# Patient Record
Sex: FEMALE | Race: WHITE | NOT HISPANIC OR LATINO | Employment: STUDENT | URBAN - METROPOLITAN AREA
[De-identification: names, ages, dates, MRNs, and addresses within clinical notes are randomized per-mention and may not be internally consistent; named-entity substitution may affect disease eponyms.]

---
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
## 2023-02-09 ENCOUNTER — E-VISIT (OUTPATIENT)
Dept: FAMILY MEDICINE | Facility: CLINIC | Age: 18
End: 2023-02-09
Payer: COMMERCIAL

## 2023-02-09 DIAGNOSIS — Z30.9 ENCOUNTER FOR CONTRACEPTIVE MANAGEMENT, UNSPECIFIED TYPE: Primary | ICD-10-CM

## 2023-02-09 PROCEDURE — 99207 PR NON-BILLABLE SERV PER CHARTING: CPT | Performed by: FAMILY MEDICINE

## 2023-02-13 ENCOUNTER — E-VISIT (OUTPATIENT)
Dept: URGENT CARE | Facility: URGENT CARE | Age: 18
End: 2023-02-13
Payer: COMMERCIAL

## 2023-02-13 DIAGNOSIS — B37.31 CANDIDAL VULVOVAGINITIS: Primary | ICD-10-CM

## 2023-02-13 PROCEDURE — 99421 OL DIG E/M SVC 5-10 MIN: CPT | Performed by: PHYSICIAN ASSISTANT

## 2023-02-13 RX ORDER — FLUCONAZOLE 150 MG/1
150 TABLET ORAL ONCE
Qty: 1 TABLET | Refills: 0 | Status: SHIPPED | OUTPATIENT
Start: 2023-02-13 | End: 2023-02-13

## 2023-02-14 NOTE — PATIENT INSTRUCTIONS
Thank you for choosing us for your care. I have placed an order for a prescription so that you can start treatment. View your full visit summary for details by clicking on the link below. Your pharmacist will able to address any questions you may have about the medication.     If you re not feeling better within 2-3 days, please schedule an appointment.  You can schedule an appointment right here in [facility], or call [phone]  If the visit is for the same symptoms as your eVisit, we ll refund the cost of your eVisit if seen within seven days.      Yeast Infection (Candida Vaginal Infection)    You have a Candida vaginal infection. This is also known as a yeast infection. It's most often caused by a type of yeast (fungus) called Candida. Candida are normally found in the vagina. But if they increase in number, this can lead to infection and cause symptoms.   Symptoms of a yeast infection can include:     Clumpy or thin, white discharge, which may look like cottage cheese    Itching or burning    Burning with urination  Certain factors can make a yeast infection more likely. These can include:     Taking certain medicines, such as antibiotics or birth control pills    Pregnancy    Diabetes    Weak immune system  A yeast infection is most often treated with antifungal medicine. This may be given as a vaginal cream or pills you take by mouth. Treatment may last for about 1 to 7 days. Women with severe or recurrent infections may need longer courses of treatment.   Home care    If you re prescribed medicine, be sure to use it as directed. Finish all of the medicine, even if your symptoms go away. Don t try to treat yourself using over-the-counter products without talking with your provider first. They will let you know if this is a good option for you.    Ask your provider what steps you can take to help reduce your risk of having a yeast infection in the future.    Follow-up care  Follow up with your healthcare  provider, or as directed.   When to seek medical advice  Call your healthcare provider right away if:     You have a fever of 100.4 F (38 C) or higher, or as directed by your provider.    Your symptoms worsen, or they don t go away within a few days of starting treatment.    You have new pain in the lower belly or pelvic region.    You have side effects that bother you or a reaction to the cream or pills you re prescribed.    You or any partners you have sex with have new symptoms, such as a rash, joint pain, or sores.  [employer] last reviewed this educational content on 7/1/2020 2000-2021 [employer]. All rights reserved. This information is not intended as a substitute for professional medical care. Always follow your healthcare professional's instructions.

## 2023-03-05 ENCOUNTER — E-VISIT (OUTPATIENT)
Dept: URGENT CARE | Facility: CLINIC | Age: 18
End: 2023-03-05
Payer: COMMERCIAL

## 2023-03-05 DIAGNOSIS — N89.8 VAGINAL DISCHARGE: Primary | ICD-10-CM

## 2023-03-05 PROCEDURE — 99421 OL DIG E/M SVC 5-10 MIN: CPT | Performed by: NURSE PRACTITIONER

## 2023-03-05 NOTE — PATIENT INSTRUCTIONS
Thank you for choosing us for your care. Given your symptoms, I would like you to do a lab-only visit to determine what is causing them.  I have placed the orders.  Please schedule an appointment with the lab right here in [id number], or call [phone].  I will let you know when the results are back and next steps to take.

## 2023-03-11 ENCOUNTER — E-VISIT (OUTPATIENT)
Dept: URGENT CARE | Facility: CLINIC | Age: 18
End: 2023-03-11
Payer: COMMERCIAL

## 2023-03-11 DIAGNOSIS — B37.31 CANDIDAL VULVOVAGINITIS: Primary | ICD-10-CM

## 2023-03-11 PROCEDURE — 99421 OL DIG E/M SVC 5-10 MIN: CPT | Performed by: NURSE PRACTITIONER

## 2023-03-11 RX ORDER — FLUCONAZOLE 150 MG/1
150 TABLET ORAL ONCE
Qty: 1 TABLET | Refills: 0 | Status: SHIPPED | OUTPATIENT
Start: 2023-03-11 | End: 2023-03-11

## 2023-03-11 NOTE — PATIENT INSTRUCTIONS
Yeast Infection (Candida Vaginal Infection)    You have a Candida vaginal infection. This is also known as a yeast infection. It's most often caused by a type of yeast (fungus) called Candida. Candida are normally found in the vagina. But if they increase in number, this can lead to infection and cause symptoms.   Symptoms of a yeast infection can include:     Clumpy or thin, white discharge, which may look like cottage cheese    Itching or burning    Burning with urination  Certain factors can make a yeast infection more likely. These can include:     Taking certain medicines, such as antibiotics or birth control pills    Pregnancy    Diabetes    Weak immune system  A yeast infection is most often treated with antifungal medicine. This may be given as a vaginal cream or pills you take by mouth. Treatment may last for about 1 to 7 days. Women with severe or recurrent infections may need longer courses of treatment.   Home care    If you re prescribed medicine, be sure to use it as directed. Finish all of the medicine, even if your symptoms go away. Don t try to treat yourself using over-the-counter products without talking with your provider first. They will let you know if this is a good option for you.    Ask your provider what steps you can take to help reduce your risk of having a yeast infection in the future.    Follow-up care  Follow up with your healthcare provider, or as directed.   When to seek medical advice  Call your healthcare provider right away if:     You have a fever of 100.4 F (38 C) or higher, or as directed by your provider.    Your symptoms worsen, or they don t go away within a few days of starting treatment.    You have new pain in the lower belly or pelvic region.    You have side effects that bother you or a reaction to the cream or pills you re prescribed.    You or any partners you have sex with have new symptoms, such as a rash, joint pain, or sores.  Jocelyn last reviewed this  educational content on 7/1/2020 2000-2021 [employer]. All rights reserved. This information is not intended as a substitute for professional medical care. Always follow your healthcare professional's instructions.

## 2023-04-29 ENCOUNTER — HEALTH MAINTENANCE LETTER (OUTPATIENT)
Age: 18
End: 2023-04-29

## 2023-07-13 ENCOUNTER — OFFICE VISIT (OUTPATIENT)
Dept: OBGYN | Facility: CLINIC | Age: 18
End: 2023-07-13
Payer: COMMERCIAL

## 2023-07-13 VITALS
DIASTOLIC BLOOD PRESSURE: 71 MMHG | BODY MASS INDEX: 21.82 KG/M2 | HEART RATE: 70 BPM | WEIGHT: 139 LBS | TEMPERATURE: 98.2 F | HEIGHT: 67 IN | SYSTOLIC BLOOD PRESSURE: 123 MMHG

## 2023-07-13 DIAGNOSIS — Z01.419 ENCOUNTER FOR ANNUAL ROUTINE GYNECOLOGICAL EXAMINATION: Primary | ICD-10-CM

## 2023-07-13 DIAGNOSIS — N94.10 DYSPAREUNIA, FEMALE: ICD-10-CM

## 2023-07-13 LAB
C TRACH DNA SPEC QL NAA+PROBE: NEGATIVE
N GONORRHOEA DNA SPEC QL NAA+PROBE: NEGATIVE

## 2023-07-13 PROCEDURE — 99385 PREV VISIT NEW AGE 18-39: CPT | Performed by: OBSTETRICS & GYNECOLOGY

## 2023-07-13 PROCEDURE — 87591 N.GONORRHOEAE DNA AMP PROB: CPT | Performed by: OBSTETRICS & GYNECOLOGY

## 2023-07-13 PROCEDURE — 87491 CHLMYD TRACH DNA AMP PROBE: CPT | Performed by: OBSTETRICS & GYNECOLOGY

## 2023-07-13 ASSESSMENT — PATIENT HEALTH QUESTIONNAIRE - PHQ9: SUM OF ALL RESPONSES TO PHQ QUESTIONS 1-9: 0

## 2023-07-13 NOTE — PROGRESS NOTES
Sandra is a 18 year old No obstetric history on file. female who presents for annual exam.     Menses are regular q 28-30 days and normal lasting 5 days.  Menses flow: normal.  Patient's last menstrual period was 06/29/2023 (exact date).. Using condoms for contraception.  She is not currently considering pregnancy.  Besides routine health maintenance,  she would like to discuss dyspareunia. She is sexually active with male and female partners and reports discomfort with vaginal penetration right at the vaginal introitus. She also has discomfort sometimes with vaginal penetration with masturbation or placing her menstrual disc. She has had this discomfort since onset of sexual activity and has not improved. She denies pelvic pain or dysmenorrhea.   GYNECOLOGIC HISTORY:  Menarche: 12  Age at first intercourse: 16   Number of lifetime partners: 7  Sandra is not sexually active with Females and males partner(s) and is not currently in monogamous relationship with anyone.    History sexually transmitted infections:No STD history  STI testing offered? Accepts   ZAHIDA exposure: Unknown  History of abnormal Pap smear: NO - under age 21, PAP not appropriate for age  Family history of breast CA: No  Family history of uterine/ovarian CA: No    Family history of colon CA: No    HEALTH MAINTENANCE:  Cholesterol: (No results found for: CHOL History of abnormal lipids: No  Mammo: N/A . History of abnormal Mammo: Not applicable.  Regular Self Breast Exams: No  Calcium/Vitamin D intake: source:  none Adequate? No  TSH: (  TSH   Date Value Ref Range Status   07/12/2022 0.85 0.40 - 4.00 mU/L Final    )  Pap; (No results found for: PAP )    HISTORY:  OB History   No obstetric history on file.     No past medical history on file.  No past surgical history on file.  No family history on file.  Social History     Socioeconomic History     Marital status: Single     Spouse name: None     Number of children: None     Years of education:  "None     Highest education level: None   Tobacco Use     Smoking status: Never     Smokeless tobacco: Never       Current Outpatient Medications:      amphetamine-dextroamphetamine (ADDERALL XR) 5 MG 24 hr capsule, Take 5-10 mg by mouth daily (Patient not taking: Reported on 7/12/2022), Disp: , Rfl:      levalbuterol (XOPENEX HFA) 45 MCG/ACT inhaler, Inhale 2 puffs into the lungs every 4 hours as needed for shortness of breath or wheezing, Disp: 15 g, Rfl: 1     trimethoprim-polymyxin b (POLYTRIM) 70997-8.1 UNIT/ML-% ophthalmic solution, Place 1-2 drops into both eyes every 4 hours, Disp: 10 mL, Rfl: 0   No Known Allergies    Past medical, surgical, social and family history were reviewed and updated in EPIC.    ROS:   C:     NEGATIVE for fever, chills, change in weight  I:       NEGATIVE for worrisome rashes, moles or lesions  E:     NEGATIVE for vision changes or irritation  E/M: NEGATIVE for ear, mouth and throat problems  R:     NEGATIVE for significant cough or SOB  CV:   NEGATIVE for chest pain, palpitations or peripheral edema  GI:     NEGATIVE for nausea, abdominal pain, heartburn, or change in bowel habits  :   NEGATIVE for frequency, dysuria, hematuria, vaginal discharge, or irregular bleeding  M:     NEGATIVE for significant arthralgias or myalgia  N:      NEGATIVE for weakness, dizziness or paresthesias  E:      NEGATIVE for temperature intolerance, skin/hair changes  P:      NEGATIVE for changes in mood or affect.    EXAM:  /71 (BP Location: Left arm, Patient Position: Sitting, Cuff Size: Adult Regular)   Pulse 70   Temp 98.2  F (36.8  C)   Ht 1.69 m (5' 6.54\")   Wt 63 kg (139 lb)   LMP 06/29/2023 (Exact Date)   BMI 22.08 kg/m     BMI: Body mass index is 22.08 kg/m .  Constitutional: healthy, alert and no distress  Head: Normocephalic. No masses, lesions, tenderness or abnormalities  Neck: Neck supple. Trachea midline. No adenopathy. Thyroid symmetric, normal size.   Cardiovascular: RRR. "   Respiratory: Negative.   Breast: Deferred  Gastrointestinal: Abdomen soft, non-tender, non-distended. No masses, organomegaly.  :  Vulva:  No external lesions, normal female hair distribution, no inguinal adenopathy.    Urethra:  Midline, non-tender, well supported, no discharge  Vagina:  Moist, pink, no abnormal discharge, no lesions. Narrow hymenal ring noted but no redundant tissue.   Uterus:  Normal size, contour , non-tender, freely mobile  Ovaries:  No masses appreciated, non-tender, mobile  Musculoskeletal: extremities normal  Skin: no suspicious lesions or rashes  Psychiatric: Affect appropriate, cooperative,mentation appears normal.     COUNSELING:   Reviewed preventive health counseling, as reflected in patient instructions       Contraception       Safe sex practices/STD prevention   reports that she has never smoked. She has never been exposed to tobacco smoke. She has never used smokeless tobacco.    Body mass index is 22.08 kg/m .    FRAX Risk Assessment    PATIENT HEALTH QUESTIONNAIRE-9 (PHQ - 9)    Over the last 2 weeks, how often have you been bothered by any of the following problems?    1. Little interest or pleasure in doing things -  Not at all   2. Feeling down, depressed, or hopeless -  Not at all   3. Trouble falling or staying asleep, or sleeping too much - Not at all   4. Feeling tired or having little energy -  Not at all   5. Poor appetite or overeating -  Not at all   6. Feeling bad about yourself - or that you are a failure or have let yourself or your family down -  Not at all   7. Trouble concentrating on things, such as reading the newspaper or watching television - Not at all   8. Moving or speaking so slowly that other people could have noticed? Or the opposite - being so fidgety or restless that you have been moving around a lot more than usual Not at all   9. Thoughts that you would be better off dead or of hurting  yourself in some way Not at all   Total Score: 0     If you  checked off any problems, how difficult have these problems made it for you to do your work, take care of things at home, or get along with other people? Not difficult at all    Developed by Shantell Hill, Julissa Ellis, Abdulaziz Watson and colleagues, with an educational judi from [employer] No permission required to reproduce, translate, display or distribute. permission required to reproduce, translate, display or distribute.  ASSESSMENT:  18 year old female with satisfactory annual exam  (Z01.419) Encounter for annual routine gynecological examination  (primary encounter diagnosis)  -Earl screening UTD but GC/CT obtained due to new partners since last screening   -Negative PHQ  -HPV vaccine series completed, due for pap in 5/2024   Plan: NEISSERIA GONORRHOEA PCR, CHLAMYDIA TRACHOMATIS        PCR          (N94.10) Dyspareunia, female  -No pelvic floor dysfunction noted on exam and low concern for endometriosis given history. Discomfort likely 2/2 narrow hymenal ring but no signs of imperforate hymen and patient able to tolerate pelvic exam in office.   -Recommend that patient do a trial of vaginal dilators at home and if symptoms not improving, would recommend referral to pelvic floor physical therapy.     Dispo: RTC as needed   Nola Christina MD

## 2023-08-09 ENCOUNTER — LAB (OUTPATIENT)
Dept: LAB | Facility: CLINIC | Age: 18
End: 2023-08-09
Payer: COMMERCIAL

## 2023-08-09 DIAGNOSIS — N94.6 DYSMENORRHEA: ICD-10-CM

## 2023-08-09 DIAGNOSIS — F41.9 ANXIETY HYPERVENTILATION: ICD-10-CM

## 2023-08-09 DIAGNOSIS — R51.9 FACIAL PAIN: ICD-10-CM

## 2023-08-09 DIAGNOSIS — E55.9 VITAMIN D DEFICIENCY DISEASE: ICD-10-CM

## 2023-08-09 DIAGNOSIS — F45.8 ANXIETY HYPERVENTILATION: ICD-10-CM

## 2023-08-09 DIAGNOSIS — R53.83 FATIGUE: Primary | ICD-10-CM

## 2023-08-09 LAB
ALT SERPL W P-5'-P-CCNC: 21 U/L (ref 0–50)
AST SERPL W P-5'-P-CCNC: 28 U/L (ref 0–45)
CORTIS SERPL-MCNC: 7.9 UG/DL
CRP SERPL-MCNC: <3 MG/L
ESTRADIOL SERPL-MCNC: 50 PG/ML
FERRITIN SERPL-MCNC: 62 NG/ML (ref 6–175)
PROGEST SERPL-MCNC: 4.5 NG/ML
T3FREE SERPL-MCNC: 2.9 PG/ML (ref 2–4.4)
T4 FREE SERPL-MCNC: 1.29 NG/DL (ref 0.9–1.7)
TSH SERPL DL<=0.005 MIU/L-ACNC: 0.71 UIU/ML (ref 0.3–4.2)
VIT B12 SERPL-MCNC: 263 PG/ML (ref 232–1245)

## 2023-08-09 PROCEDURE — 84432 ASSAY OF THYROGLOBULIN: CPT | Mod: 59

## 2023-08-09 PROCEDURE — 82627 DEHYDROEPIANDROSTERONE: CPT

## 2023-08-09 PROCEDURE — 84403 ASSAY OF TOTAL TESTOSTERONE: CPT

## 2023-08-09 PROCEDURE — 84432 ASSAY OF THYROGLOBULIN: CPT

## 2023-08-09 PROCEDURE — 86800 THYROGLOBULIN ANTIBODY: CPT

## 2023-08-09 PROCEDURE — 84450 TRANSFERASE (AST) (SGOT): CPT

## 2023-08-09 PROCEDURE — 84443 ASSAY THYROID STIM HORMONE: CPT

## 2023-08-09 PROCEDURE — 86376 MICROSOMAL ANTIBODY EACH: CPT

## 2023-08-09 PROCEDURE — 84144 ASSAY OF PROGESTERONE: CPT

## 2023-08-09 PROCEDURE — 86140 C-REACTIVE PROTEIN: CPT

## 2023-08-09 PROCEDURE — 82728 ASSAY OF FERRITIN: CPT

## 2023-08-09 PROCEDURE — 82607 VITAMIN B-12: CPT

## 2023-08-09 PROCEDURE — 84481 FREE ASSAY (FT-3): CPT

## 2023-08-09 PROCEDURE — 82670 ASSAY OF TOTAL ESTRADIOL: CPT

## 2023-08-09 PROCEDURE — 36415 COLL VENOUS BLD VENIPUNCTURE: CPT

## 2023-08-09 PROCEDURE — 84460 ALANINE AMINO (ALT) (SGPT): CPT

## 2023-08-09 PROCEDURE — 84439 ASSAY OF FREE THYROXINE: CPT

## 2023-08-09 PROCEDURE — 82306 VITAMIN D 25 HYDROXY: CPT

## 2023-08-09 PROCEDURE — 99000 SPECIMEN HANDLING OFFICE-LAB: CPT

## 2023-08-09 PROCEDURE — 82533 TOTAL CORTISOL: CPT

## 2023-08-09 PROCEDURE — 86337 INSULIN ANTIBODIES: CPT | Mod: 90

## 2023-08-10 LAB
DEPRECATED CALCIDIOL+CALCIFEROL SERPL-MC: 34 UG/L (ref 20–75)
DHEA-S SERPL-MCNC: 138 UG/DL (ref 35–430)
THYROGLOB AB SERPL IA-ACNC: <20 IU/ML
THYROGLOB SERPL-MCNC: 17.2 NG/ML
THYROPEROXIDASE AB SERPL-ACNC: <10 IU/ML

## 2023-08-11 LAB — TESTOST SERPL-MCNC: 17 NG/DL (ref 8–60)

## 2023-08-13 LAB — INSULIN AB SER IA-ACNC: <0.4 U/ML

## 2023-09-05 ENCOUNTER — OFFICE VISIT (OUTPATIENT)
Dept: FAMILY MEDICINE | Facility: CLINIC | Age: 18
End: 2023-09-05
Payer: COMMERCIAL

## 2023-09-05 VITALS
DIASTOLIC BLOOD PRESSURE: 62 MMHG | SYSTOLIC BLOOD PRESSURE: 106 MMHG | RESPIRATION RATE: 18 BRPM | OXYGEN SATURATION: 98 % | HEART RATE: 76 BPM | TEMPERATURE: 98.3 F | BODY MASS INDEX: 22.19 KG/M2 | WEIGHT: 139.7 LBS

## 2023-09-05 DIAGNOSIS — S43.431D TEAR OF RIGHT GLENOID LABRUM, SUBSEQUENT ENCOUNTER: ICD-10-CM

## 2023-09-05 DIAGNOSIS — J45.40 MODERATE PERSISTENT ASTHMA WITHOUT COMPLICATION: ICD-10-CM

## 2023-09-05 DIAGNOSIS — Z01.818 PREOP GENERAL PHYSICAL EXAM: Primary | ICD-10-CM

## 2023-09-05 LAB
HCG UR QL: NEGATIVE
HGB BLD-MCNC: 14.4 G/DL (ref 11.7–15.7)

## 2023-09-05 PROCEDURE — 36415 COLL VENOUS BLD VENIPUNCTURE: CPT | Performed by: FAMILY MEDICINE

## 2023-09-05 PROCEDURE — 99214 OFFICE O/P EST MOD 30 MIN: CPT | Performed by: FAMILY MEDICINE

## 2023-09-05 PROCEDURE — 85018 HEMOGLOBIN: CPT | Performed by: FAMILY MEDICINE

## 2023-09-05 PROCEDURE — 81025 URINE PREGNANCY TEST: CPT | Performed by: FAMILY MEDICINE

## 2023-09-05 RX ORDER — LEVALBUTEROL TARTRATE 45 UG/1
2 AEROSOL, METERED ORAL EVERY 4 HOURS PRN
Qty: 15 G | Refills: 1 | Status: SHIPPED | OUTPATIENT
Start: 2023-09-05 | End: 2024-02-14

## 2023-09-05 RX ORDER — FLUTICASONE PROPIONATE AND SALMETEROL XINAFOATE 230; 21 UG/1; UG/1
2 AEROSOL, METERED RESPIRATORY (INHALATION) 2 TIMES DAILY
Qty: 12 G | Refills: 1 | Status: SHIPPED | OUTPATIENT
Start: 2023-09-05 | End: 2023-12-15

## 2023-09-05 RX ORDER — FLUTICASONE PROPIONATE 50 MCG
1 SPRAY, SUSPENSION (ML) NASAL DAILY
COMMUNITY
End: 2024-02-14

## 2023-09-05 ASSESSMENT — ASTHMA QUESTIONNAIRES: ACT_TOTALSCORE: 17

## 2023-09-05 NOTE — PROGRESS NOTES
[facility]  [address] [address]  Phone: [phone]  Primary Provider: Dayna Barnes  Pre-op Performing Provider: DAYNA BARNES      PREOPERATIVE EVALUATION:  Today's date: 9/5/2023    Sandra Moraes is a 18 year old female who presents for a preoperative evaluation.      9/5/2023    10:06 AM   Additional Questions   Roomed by SUKH Wright   Accompanied by self       Surgical Information:  Surgery/Procedure: right shoulder arthroscopic , Labral repair   Surgery Location: [facility]   Surgeon: Dr. Shipman- [facility]   Surgery Date: 9/8/23  Time of Surgery: 1300  Where patient plans to recover: At home with family  Fax number for surgical facility: [phone]    Assessment & Plan     The proposed surgical procedure is considered INTERMEDIATE risk.      ICD-10-CM    1. Preop general physical exam  Z01.818 HCG Qual, Urine ([id number])     Hemoglobin     HCG Qual, Urine ([id number])     Hemoglobin      2. Tear of right glenoid labrum, subsequent encounter  S43.431D HCG Qual, Urine ([id number])     Hemoglobin     HCG Qual, Urine ([id number])     Hemoglobin      3. Moderate persistent asthma without complication  J45.40 fluticasone-salmeterol (ADVAIR-HFA) 230-21 MCG/ACT inhaler     levalbuterol (XOPENEX HFA) 45 MCG/ACT inhaler                   Risks and Recommendations:  The patient has the following additional risks and recommendations for perioperative complications:   - No identified additional risk factors other than previously addressed    Antiplatelet or Anticoagulation Medication Instructions:   - Patient is on no antiplatelet or anticoagulation medications.    Additional Medication Instructions:   - LABA, inhaled corticosteroid, long-acting anticholinergics: Continue without modification.   - rescue Inhaler: Continue PRN. Bring to hospital on the day of surgery.    RECOMMENDATION:  APPROVAL GIVEN to proceed with proposed procedure, without further  "diagnostic evaluation.            Subjective         HPI related to upcoming procedure:   Injured her shoulder snow boarding March of 2022.  Did a lot of PT with improvement initially but began to bother more with different activities.  Now planned surgical repair     Was seen in December for \"flu like \" symptoms and was given an inhaler she liked but lost it in her move.  Has a diagnosis of asthma but has been \"against inhalers\" because albuterol would cause a racing heart.  Was given Xopenex.    Feels like SOB has been worsening the last 3 months.  Seems off and on.  Blames it on allergy and smoking.  Feels she able to run without any difficulty for her work outs, does not feel limited by her breathing.  Describes tightness in her throat with breathing, not in her chest.  No wheezing.  Did have a breathing test in 2021 and was seeing pulmonology.  Apparently formal diagnosis of asthma was made then.  Had some type of daily inhaler but did not really take it as she did not feel it was the right fit for her.  Not sure what that medicine was.  Has not followed up since.  Breathing has been worse this summer with air quality.  Thinking she should probably start something.          9/5/2023     9:19 AM   Preop Questions   1. Have you ever had a heart attack or stroke? No   2. Have you ever had surgery on your heart or blood vessels, such as a stent placement, a coronary artery bypass, or surgery on an artery in your head, neck, heart, or legs? No   3. Do you have chest pain with activity? No   4. Do you have a history of  heart failure? No   5. Do you currently have a cold, bronchitis or symptoms of other infection? No   6. Do you have a cough, shortness of breath, or wheezing? No   7. Do you or anyone in your family have previous history of blood clots? No   8. Do you or does anyone in your family have a serious bleeding problem such as prolonged bleeding following surgeries or cuts? No   9. Have you ever had problems " with anemia or been told to take iron pills? YES - on iron   10. Have you had any abnormal blood loss such as black, tarry or bloody stools, or abnormal vaginal bleeding? No   11. Have you ever had a blood transfusion? No   12. Are you willing to have a blood transfusion if it is medically needed before, during, or after your surgery? Yes   13. Have you or any of your relatives ever had problems with anesthesia? No   14. Do you have sleep apnea, excessive snoring or daytime drowsiness? No   15. Do you have any artifical heart valves or other implanted medical devices like a pacemaker, defibrillator, or continuous glucose monitor? No   16. Do you have artificial joints? No   17. Are you allergic to latex? No   18. Is there any chance that you may be pregnant? No       Health Care Directive:  Patient does not have a Health Care Directive or Living Will: Discussed advance care planning with patient; however, patient declined at this time.    Preoperative Review of :   reviewed - no record of controlled substances prescribed.      Status of Chronic Conditions:  See problem list for active medical problems.  Problems all longstanding and stable, except as noted/documented.  See ROS for pertinent symptoms related to these conditions.    Review of Systems  Constitutional, neuro, ENT, endocrine, pulmonary, cardiac, gastrointestinal, genitourinary, musculoskeletal, integument and psychiatric systems are negative, except as otherwise noted.    There are no problems to display for this patient.     Past Medical History:   Diagnosis Date    Depressive disorder 2019    Uncomplicated asthma 2005     Past Surgical History:   Procedure Laterality Date    ENT SURGERY  11/2021    tonsillectomy     Current Outpatient Medications   Medication Sig Dispense Refill    Fexofenadine HCl (ALLEGRA PO)       fluticasone (FLONASE) 50 MCG/ACT nasal spray Spray 1 spray into both nostrils daily      amphetamine-dextroamphetamine (ADDERALL  XR) 5 MG 24 hr capsule Take 5-10 mg by mouth daily (Patient not taking: Reported on 7/12/2022)         No Known Allergies     Social History     Tobacco Use    Smoking status: Passive Smoke Exposure - Never Smoker     Passive exposure: Never    Smokeless tobacco: Never    Tobacco comments:     i use vapes passivley - if friends have them - trying to quit at the moment   Substance Use Topics    Alcohol use: Yes     Comment: 2-4 days a week     Family History   Problem Relation Age of Onset    Thyroid Disease Mother     Depression Father     Anxiety Disorder Father     Mental Illness Father      History   Drug Use    Types: Marijuana     Comment: 1-3x a week         Objective     /62   Pulse 76   Temp 98.3  F (36.8  C) (Tympanic)   Resp 18   Wt 63.4 kg (139 lb 11.2 oz)   LMP 08/12/2023 (Exact Date)   SpO2 98%   BMI 22.19 kg/m      Physical Exam    GENERAL APPEARANCE: healthy, alert and no distress     EYES: EOMI, PERRL     NECK: no adenopathy, no asymmetry, masses, or scars and thyroid normal to palpation     RESP: lungs clear to auscultation - no rales, rhonchi or wheezes     CV: regular rates and rhythm, normal S1 S2, no S3 or S4 and no murmur, click or rub     MS: extremities normal- no gross deformities noted, no evidence of inflammation in joints, FROM in all extremities.     NEURO: Normal strength and tone, sensory exam grossly normal, mentation intact and speech normal     PSYCH: mentation appears normal. and affect normal/bright     LYMPHATICS: No cervical adenopathy    Recent Labs   Lab Test 07/12/22  1430 12/04/21  0329   HGB 13.9 12.6    244        Diagnostics:  Recent Results (from the past 24 hour(s))   HCG Qual, Urine ([id number])    Collection Time: 09/05/23 11:33 AM   Result Value Ref Range    hCG Urine Qualitative Negative Negative   Hemoglobin    Collection Time: 09/05/23 11:33 AM   Result Value Ref Range    Hemoglobin 14.4 11.7 - 15.7 g/dL      No EKG required, no history of  coronary heart disease, significant arrhythmia, peripheral arterial disease or other structural heart disease.    Revised Cardiac Risk Index (RCRI):  The patient has the following serious cardiovascular risks for perioperative complications:   - No serious cardiac risks = 0 points     RCRI Interpretation: 0 points: Class I (very low risk - 0.4% complication rate)         Signed Electronically by: Dayna Coy DO  Copy of this evaluation report is provided to requesting physician.

## 2023-09-05 NOTE — PATIENT INSTRUCTIONS
For informational purposes only. Not to replace the advice of your health care provider. Copyright   2003,  [facility]. All rights reserved. Clinically reviewed by Brittni Barrera MD. Mover [id number] - REV .  Preparing for Your Surgery  Getting started  A nurse will call you to review your health history and instructions. They will give you an arrival time based on your scheduled surgery time. Please be ready to share:  Your doctor's clinic name and phone number  Your medical, surgical, and anesthesia history  A list of allergies and sensitivities  A list of medicines, including herbal treatments and over-the-counter drugs  Whether the patient has a legal guardian (ask how to send us the papers in advance)  Please tell us if you're pregnant--or if there's any chance you might be pregnant. Some surgeries may injure a fetus (unborn baby), so they require a pregnancy test. Surgeries that are safe for a fetus don't always need a test, and you can choose whether to have one.   If you have a child who's having surgery, please ask for a copy of Preparing for Your Child's Surgery.    Preparing for surgery  Within 10 to 30 days of surgery: Have a pre-op exam (sometimes called an H&P, or History and Physical). This can be done at a clinic or pre-operative center.  If you're having a , you may not need this exam. Talk to your care team.  At your pre-op exam, talk to your care team about all medicines you take. If you need to stop any medicines before surgery, ask when to start taking them again.  We do this for your safety. Many medicines can make you bleed too much during surgery. Some change how well surgery (anesthesia) drugs work.  Call your insurance company to let them know you're having surgery. (If you don't have insurance, call [phone].)  Call your clinic if there's any change in your health. This includes signs of a cold or flu (sore throat, runny nose, cough, rash, fever). It also  includes a scrape or scratch near the surgery site.  If you have questions on the day of surgery, call your hospital or surgery center.  Eating and drinking guidelines  For your safety: Unless your surgeon tells you otherwise, follow the guidelines below.  Eat and drink as usual until 8 hours before you arrive for surgery. After that, no food or milk.  Drink clear liquids until 2 hours before you arrive. These are liquids you can see through, like water, Gatorade, and Propel Water. They also include plain black coffee and tea (no cream or milk), candy, and breath mints. You can spit out gum when you arrive.  If you drink alcohol: Stop drinking it the night before surgery.  If your care team tells you to take medicine on the morning of surgery, it's okay to take it with a sip of water.  Preventing infection  Shower or bathe the night before and morning of your surgery. Follow the instructions your clinic gave you. (If no instructions, use regular soap.)  Don't shave or clip hair near your surgery site. We'll remove the hair if needed.  Don't smoke or vape the morning of surgery. You may chew nicotine gum up to 2 hours before surgery. A nicotine patch is okay.  Note: Some surgeries require you to completely quit smoking and nicotine. Check with your surgeon.  Your care team will make every effort to keep you safe from infection. We will:  Clean our hands often with soap and water (or an alcohol-based hand rub).  Clean the skin at your surgery site with a special soap that kills germs.  Give you a special gown to keep you warm. (Cold raises the risk of infection.)  Wear special hair covers, masks, gowns and gloves during surgery.  Give antibiotic medicine, if prescribed. Not all surgeries need antibiotics.  What to bring on the day of surgery  Photo ID and insurance card  Copy of your health care directive, if you have one  Glasses and hearing aids (bring cases)  You can't wear contacts during surgery  Inhaler and eye  drops, if you use them (tell us about these when you arrive)  CPAP machine or breathing device, if you use them  A few personal items, if spending the night  If you have . . .  A pacemaker, ICD (cardiac defibrillator) or other implant: Bring the ID card.  An implanted stimulator: Bring the remote control.  A legal guardian: Bring a copy of the certified (court-stamped) guardianship papers.  Please remove any jewelry, including body piercings. Leave jewelry and other valuables at home.  If you're going home the day of surgery  You must have a responsible adult drive you home. They should stay with you overnight as well.  If you don't have someone to stay with you, and you aren't safe to go home alone, we may keep you overnight. Insurance often won't pay for this.  After surgery  If it's hard to control your pain or you need more pain medicine, please call your surgeon's office.  Questions?   If you have any questions for your care team, list them here: _________________________________________________________________________________________________________________________________________________________________________ ____________________________________ ____________________________________ ____________________________________    How to Take Your Medication Before Surgery  - STOP taking all vitamins and herbal supplements 14 days before surgery.    Please bring your Xopenex inhaler to the hospital with you

## 2023-09-06 ENCOUNTER — TELEPHONE (OUTPATIENT)
Dept: FAMILY MEDICINE | Facility: CLINIC | Age: 18
End: 2023-09-06
Payer: COMMERCIAL

## 2023-09-06 DIAGNOSIS — J45.40 MODERATE PERSISTENT ASTHMA WITHOUT COMPLICATION: Primary | ICD-10-CM

## 2023-09-06 NOTE — TELEPHONE ENCOUNTER
Prior Authorization Retail Medication Request    Medication/Dose: Levalbuterol 45mcg  ICD code (if different than what is on RX):    Moderate persistent asthma without complication [J45.40]     Previously Tried and Failed:    Rationale:      Covermymeds:  Key: [id number]  Last Name: Dimitry  : 2005      Pharmacy Information (if different than what is on RX)  Name:  [facility] [place]  Phone:  [phone]

## 2023-09-07 NOTE — TELEPHONE ENCOUNTER
Central Prior Authorization Team  Phone: [phone]    [place] Initiation    Medication: LEVALBUTEROL TARTRATE 45 MCG/ACT IN AERO  Insurance Company: [employer] [place] - Phone [phone] Fax [phone]  Pharmacy Filling the Rx: [id number] - [place], [place] - [address] [place] [address] [address] AT [facility] [address]  Filling Pharmacy Phone: [phone]  Filling Pharmacy Fax:    Start Date: 9/7/2023

## 2023-09-08 ENCOUNTER — TRANSFERRED RECORDS (OUTPATIENT)
Dept: HEALTH INFORMATION MANAGEMENT | Facility: CLINIC | Age: 18
End: 2023-09-08

## 2023-09-08 NOTE — TELEPHONE ENCOUNTER
Central Prior Authorization Team  Phone: [phone]    PRIOR AUTHORIZATION DENIED    Medication: LEVALBUTEROL TARTRATE 45 MCG/ACT IN AERO  Insurance Company: [employer] [place] - Phone [phone] Fax [phone]  Denial Date: 9/8/2023  Denial Rational:         Appeal Information:         Patient Notified:

## 2023-09-18 RX ORDER — ALBUTEROL SULFATE 90 UG/1
2 AEROSOL, METERED RESPIRATORY (INHALATION) EVERY 6 HOURS PRN
Qty: 18 G | Refills: 0 | Status: SHIPPED | OUTPATIENT
Start: 2023-09-18

## 2023-09-18 NOTE — TELEPHONE ENCOUNTER
Call placed to Patient.  Relayed Dr Irizarry message.  Patient would like to try Albuterol again.  Requested that Patient notify staff of intolerance to medication.  Verbalized understanding.  Carlos Eduardo Landry RN

## 2023-09-18 NOTE — TELEPHONE ENCOUNTER
Please call pt.  The xopenex inhaler was denied.  As she did not try the albuterol inhaler through our clinic I do not have chart notes to send regarding her intolerance.  We might need to try the albuterol again before her insurance will cover the xopenex.  Would she like me to send a prescription?    Dr. Dayna Coy, DO

## 2023-11-02 DIAGNOSIS — N94.6 DYSMENORRHEA: ICD-10-CM

## 2023-11-02 DIAGNOSIS — R51.9 FACIAL PAIN: ICD-10-CM

## 2023-11-02 DIAGNOSIS — F41.9 ANXIETY: ICD-10-CM

## 2023-11-02 DIAGNOSIS — R53.83 FATIGUE: ICD-10-CM

## 2023-11-02 DIAGNOSIS — E55.9 VITAMIN D DEFICIENCY: Primary | ICD-10-CM

## 2023-11-03 ENCOUNTER — LAB (OUTPATIENT)
Dept: LAB | Facility: CLINIC | Age: 18
End: 2023-11-03
Payer: COMMERCIAL

## 2023-11-03 DIAGNOSIS — N94.6 DYSMENORRHEA: ICD-10-CM

## 2023-11-03 DIAGNOSIS — E55.9 VITAMIN D DEFICIENCY: ICD-10-CM

## 2023-11-03 DIAGNOSIS — R51.9 FACIAL PAIN: ICD-10-CM

## 2023-11-03 DIAGNOSIS — R53.83 FATIGUE: ICD-10-CM

## 2023-11-03 DIAGNOSIS — F41.9 ANXIETY: ICD-10-CM

## 2023-11-03 LAB
ESTRADIOL SERPL-MCNC: 99 PG/ML
PROGEST SERPL-MCNC: 1.1 NG/ML
VIT B12 SERPL-MCNC: 249 PG/ML (ref 232–1245)
VIT D+METAB SERPL-MCNC: 39 NG/ML (ref 20–50)

## 2023-11-03 PROCEDURE — 82670 ASSAY OF TOTAL ESTRADIOL: CPT

## 2023-11-03 PROCEDURE — 84403 ASSAY OF TOTAL TESTOSTERONE: CPT

## 2023-11-03 PROCEDURE — 82306 VITAMIN D 25 HYDROXY: CPT

## 2023-11-03 PROCEDURE — 36415 COLL VENOUS BLD VENIPUNCTURE: CPT

## 2023-11-03 PROCEDURE — 84144 ASSAY OF PROGESTERONE: CPT

## 2023-11-03 PROCEDURE — 82607 VITAMIN B-12: CPT

## 2023-11-07 LAB — TESTOST SERPL-MCNC: 20 NG/DL (ref 8–60)

## 2023-12-15 ENCOUNTER — MYC REFILL (OUTPATIENT)
Dept: FAMILY MEDICINE | Facility: CLINIC | Age: 18
End: 2023-12-15
Payer: COMMERCIAL

## 2023-12-15 DIAGNOSIS — J45.40 MODERATE PERSISTENT ASTHMA WITHOUT COMPLICATION: ICD-10-CM

## 2023-12-15 RX ORDER — FLUTICASONE PROPIONATE AND SALMETEROL XINAFOATE 230; 21 UG/1; UG/1
2 AEROSOL, METERED RESPIRATORY (INHALATION) 2 TIMES DAILY
Qty: 12 G | Refills: 1 | Status: SHIPPED | OUTPATIENT
Start: 2023-12-15 | End: 2024-02-14

## 2023-12-15 NOTE — TELEPHONE ENCOUNTER
"Requested Prescriptions   Pending Prescriptions Disp Refills    fluticasone-salmeterol (ADVAIR-HFA) 230-21 MCG/ACT inhaler 12 g 1     Sig: Inhale 2 puffs into the lungs 2 times daily       Inhaled Steroids Protocol Failed - 12/15/2023  2:03 PM        Failed - Asthma control assessment score within normal limits in last 6 months     Please review ACT score.           Passed - Patient is age 12 or older        Passed - Medication is active on med list        Passed - Recent (6 mo) or future (30 days) visit within the authorizing provider's specialty     Patient had office visit in the last 6 months or has a visit in the next 30 days with authorizing provider or within the authorizing provider's specialty.  See \"Patient Info\" tab in inbasket, or \"Choose Columns\" in Meds & Orders section of the refill encounter.           Long-Acting Beta Agonist Inhalers Protocol  Failed - 12/15/2023  2:03 PM        Failed - Asthma control assessment score within normal limits in last 6 months     Please review ACT score.           Passed - Patient is age 12 or older        Passed - Medication is active on med list        Passed - Recent (6 mo) or future (30 days) visit within the authorizing provider's specialty     Patient had office visit in the last 6 months or has a visit in the next 30 days with authorizing provider or within the authorizing provider's specialty.  See \"Patient Info\" tab in inbasket, or \"Choose Columns\" in Meds & Orders section of the refill encounter.                 "

## 2023-12-18 ENCOUNTER — TRANSFERRED RECORDS (OUTPATIENT)
Dept: HEALTH INFORMATION MANAGEMENT | Facility: CLINIC | Age: 18
End: 2023-12-18

## 2024-01-29 ENCOUNTER — E-VISIT (OUTPATIENT)
Dept: FAMILY MEDICINE | Facility: CLINIC | Age: 19
End: 2024-01-29
Payer: COMMERCIAL

## 2024-01-29 DIAGNOSIS — N39.0 ACUTE UTI (URINARY TRACT INFECTION): Primary | ICD-10-CM

## 2024-01-29 DIAGNOSIS — B37.31 CANDIDAL VULVOVAGINITIS: Primary | ICD-10-CM

## 2024-01-29 PROCEDURE — 99207 PR NON-BILLABLE SERV PER CHARTING: CPT | Performed by: PHYSICIAN ASSISTANT

## 2024-01-29 RX ORDER — FLUCONAZOLE 150 MG/1
150 TABLET ORAL ONCE
Qty: 1 TABLET | Refills: 0 | Status: SHIPPED | OUTPATIENT
Start: 2024-01-29 | End: 2024-01-29

## 2024-01-29 RX ORDER — NITROFURANTOIN 25; 75 MG/1; MG/1
100 CAPSULE ORAL 2 TIMES DAILY
Qty: 10 CAPSULE | Refills: 0 | Status: SHIPPED | OUTPATIENT
Start: 2024-01-29 | End: 2024-02-03

## 2024-01-30 NOTE — PATIENT INSTRUCTIONS
Dear Sandra Moraes    After reviewing your responses, I've been able to diagnose you with a urinary tract infection, which is a common infection of the bladder with bacteria.  This is not a sexually transmitted infection, though urinating immediately after intercourse can help prevent infections.  Drinking lots of fluids is also helpful to clear your current infection and prevent the next one.      I have sent a prescription for antibiotics to your pharmacy to treat this infection.    It is important that you take all of your prescribed medication even if your symptoms are improving after a few doses.  Taking all of your medicine helps prevent the symptoms from returning.     If your symptoms worsen, you develop pain in your back or stomach, develop fevers, or are not improving in 5 days, please contact your primary care provider for an appointment or visit any of our convenient Walk-in or Urgent Care Centers to be seen, which can be found on our website here.    Thanks again for choosing us as your health care partner,    Eric Acuna PA-C

## 2024-01-30 NOTE — PATIENT INSTRUCTIONS
Thank you for choosing us for your care. I have placed an order for a prescription so that you can start treatment. View your full visit summary for details by clicking on the link below. Your pharmacist will able to address any questions you may have about the medication.     If you re not feeling better within 2-3 days, please schedule an appointment.  You can schedule an appointment right here in [facility], or call [phone]  If the visit is for the same symptoms as your eVisit, we ll refund the cost of your eVisit if seen within seven days.    Vaginal Yeast Infection: Care Instructions  Overview     A vaginal yeast infection is the growth of too many yeast cells in the vagina. This is a common problem. Itching, vaginal discharge and irritation, and other symptoms can bother you. But yeast infections don't often cause other health problems.  Some medicines can increase your risk of getting a yeast infection. These include antibiotics, hormones, and steroids. You may also be more likely to get a yeast infection if you are pregnant, have diabetes, douche, or wear tight clothes.  With treatment, most yeast infections get better in a few days.  Follow-up care is a key part of your treatment and safety. Be sure to make and go to all appointments, and call your doctor if you are having problems. It's also a good idea to know your test results and keep a list of the medicines you take.  How can you care for yourself at home?    Take your medicines exactly as prescribed. Call your doctor if you think you are having a problem with your medicine.    Ask your doctor about over-the-counter (OTC) medicines for yeast infections. If you use an OTC treatment, read and follow all instructions on the label.    Don't use tampons while using a vaginal cream or suppository. The tampons can absorb the medicine. Use pads instead.    Wear loose cotton clothing. Don't wear nylon or other fabric that holds body heat and moisture close  "to the skin.    Try sleeping without underwear.    Don't scratch. Relieve itching with a cold pack or a cool bath.    Don't wash your vulva more than once a day. Use plain water or a mild, unscented soap. Air-dry the vulva.    Change out of wet or damp clothes as soon as possible.    If you are using a vaginal medicine, don't have sex until you have finished your treatment. But if you do have sex, don't depend on a condom or diaphragm for birth control. The oil in some vaginal medicines weakens latex.    Don't douche or use powders, sprays, or perfumes in your vagina or on your vulva. These items can change the normal balance of organisms in your vagina.  When should you call for help?   Call your doctor now or seek immediate medical care if:      You have new or increased pain in your vagina or pelvis.   Watch closely for changes in your health, and be sure to contact your doctor if:      You have unexpected vaginal bleeding.       You have a fever.       You are not getting better after 2 days.       Your symptoms come back after you finish your medicines.   Where can you learn more?  Go to [email or web address].net/patiented  Enter F639 in the search box to learn more about \"Vaginal Yeast Infection: Care Instructions.\"  Current as of: April 19, 2023               Content Version: 13.8    7637-8280 [employer].   Care instructions adapted under license by your healthcare professional. If you have questions about a medical condition or this instruction, always ask your healthcare professional. [employer] disclaims any warranty or liability for your use of this information.      "

## 2024-02-11 SDOH — HEALTH STABILITY: PHYSICAL HEALTH: ON AVERAGE, HOW MANY MINUTES DO YOU ENGAGE IN EXERCISE AT THIS LEVEL?: 90 MIN

## 2024-02-11 SDOH — HEALTH STABILITY: PHYSICAL HEALTH: ON AVERAGE, HOW MANY DAYS PER WEEK DO YOU ENGAGE IN MODERATE TO STRENUOUS EXERCISE (LIKE A BRISK WALK)?: 4 DAYS

## 2024-02-11 ASSESSMENT — ASTHMA QUESTIONNAIRES
QUESTION_4 LAST FOUR WEEKS HOW OFTEN HAVE YOU USED YOUR RESCUE INHALER OR NEBULIZER MEDICATION (SUCH AS ALBUTEROL): TWO OR THREE TIMES PER WEEK
QUESTION_3 LAST FOUR WEEKS HOW OFTEN DID YOUR ASTHMA SYMPTOMS (WHEEZING, COUGHING, SHORTNESS OF BREATH, CHEST TIGHTNESS OR PAIN) WAKE YOU UP AT NIGHT OR EARLIER THAN USUAL IN THE MORNING: NOT AT ALL
QUESTION_1 LAST FOUR WEEKS HOW MUCH OF THE TIME DID YOUR ASTHMA KEEP YOU FROM GETTING AS MUCH DONE AT WORK, SCHOOL OR AT HOME: A LITTLE OF THE TIME
QUESTION_5 LAST FOUR WEEKS HOW WOULD YOU RATE YOUR ASTHMA CONTROL: WELL CONTROLLED
ACT_TOTALSCORE: 18
QUESTION_2 LAST FOUR WEEKS HOW OFTEN HAVE YOU HAD SHORTNESS OF BREATH: ONCE A DAY
ACT_TOTALSCORE: 18

## 2024-02-11 NOTE — COMMUNITY RESOURCES LIST (ENGLISH)
02/11/2024   [facility] [employer]  N/A  For questions about this resource list or additional care needs, please contact your primary care clinic or care manager.  Phone: [phone]   Email: N/A   Address: [address] [place] [address]   Hours: N/A        Financial Stability       Rent and mortgage payment assistance  1  [facility] - Rent payment assistance Distance: 6.19 miles      In-Person, Phone/Virtual   [address] [place] [address]  Language: English  Hours: Mon - Fri 8:00 AM - 4:30 PM  Fees: Free   Phone: (596) 475-8632 Email: [email or web address]. Website: [email or web address]://www.[email or web address]./Facilities/Facility/Details/23     2  [employer] Distance: 8.54 miles      In-Person, Phone/Virtual   [address] [place] [address]  Language: English  Hours: Mon - Sun Appt. Only  Fees: Free   Phone: (538) 154-9255 Email: tamia@[employer] Website: http://www.communityhelpinghand.org          Important Numbers & Websites       Emergency Services   911  City Services   311  Poison Control   (591) 913-7560  Suicide Prevention Lifeline   (802) 252-4525 (TALK)  Child Abuse Hotline   (938) 904-4382 (4-A-Child)  Sexual Assault Hotline   (874) 114-2228 (HOPE)  National Runaway Safeline   (546) 305-9725 (RUNAWAY)  All-Options Talkline   (917) 632-8354  Substance Abuse Referral   (830) 120-2918 (HELP)

## 2024-02-11 NOTE — COMMUNITY RESOURCES LIST (ENGLISH)
02/11/2024   [facility] [employer]  N/A  For questions about this resource list or additional care needs, please contact your primary care clinic or care manager.  Phone: [phone]   Email: N/A   Address: [address] [place] [address]   Hours: N/A        Financial Stability       Rent and mortgage payment assistance  1  [facility] - Rent payment assistance Distance: 6.19 miles      In-Person, Phone/Virtual   [address] [place] [address]  Language: English  Hours: Mon - Fri 8:00 AM - 4:30 PM  Fees: Free   Phone: (879) 712-8974 Email: [email or web address]@[email or web address]. Website: [email or web address]://www.[email or web address]./Facilities/Facility/Details/23     2  [employer] Distance: 8.54 miles      In-Person, Phone/Virtual   [address] [place] [address]  Language: English  Hours: Mon - Sun Appt. Only  Fees: Free   Phone: (752) 551-1236 Email: tamia@[employer] Website: http://www.communityhelpinghand.org          Important Numbers & Websites       Emergency Services   911  City Services   311  Poison Control   (510) 378-1277  Suicide Prevention Lifeline   (891) 960-4371 (TALK)  Child Abuse Hotline   (465) 502-7444 (4-A-Child)  Sexual Assault Hotline   (974) 874-1745 (HOPE)  National Runaway Safeline   (584) 192-6047 (RUNAWAY)  All-Options Talkline   (567) 520-6809  Substance Abuse Referral   (641) 787-3267 (HELP)

## 2024-02-11 NOTE — COMMUNITY RESOURCES LIST (ENGLISH)
02/11/2024   [facility] [employer]  N/A  For questions about this resource list or additional care needs, please contact your primary care clinic or care manager.  Phone: [phone]   Email: N/A   Address: [address] [place] [address]   Hours: N/A        Financial Stability       Rent and mortgage payment assistance  1  [facility] - Rent payment assistance Distance: 6.19 miles      In-Person, Phone/Virtual   [address] [place] [address]  Language: English  Hours: Mon - Fri 8:00 AM - 4:30 PM  Fees: Free   Phone: (451) 128-3787 Email: [email or web address]@[facility]. Website: https://www.[facility]./Facilities/Facility/Details/23     2  [employer] Distance: 8.54 miles      In-Person, Phone/Virtual   [address] [place] [address]  Language: English  Hours: Mon - Sun Appt. Only  Fees: Free   Phone: (216) 272-7948 Email: tamia@[employer] Website: http://www.communityhelpinghand.org          Important Numbers & Websites       Emergency Services   911  City Services   311  Poison Control   (422) 813-5785  Suicide Prevention Lifeline   (731) 734-9808 (TALK)  Child Abuse Hotline   (166) 577-7040 (4-A-Child)  Sexual Assault Hotline   (697) 472-2795 (HOPE)  National Runaway Safeline   (728) 892-8637 (RUNAWAY)  All-Options Talkline   (308) 223-9506  Substance Abuse Referral   (221) 824-1055 (HELP)

## 2024-02-11 NOTE — COMMUNITY RESOURCES LIST (ENGLISH)
02/11/2024   [facility] [employer]  N/A  For questions about this resource list or additional care needs, please contact your primary care clinic or care manager.  Phone: [phone]   Email: N/A   Address: [address] [place] [address]   Hours: N/A        Financial Stability       Rent and mortgage payment assistance  1  [facility] - Rent payment assistance Distance: 6.19 miles      In-Person, Phone/Virtual   [address] [place] [address]  Language: English  Hours: Mon - Fri 8:00 AM - 4:30 PM  Fees: Free   Phone: (517) 279-2212 Email: [email or web address]@[email or web address]. Website: https://www.[facility]./Facilities/Facility/Details/23     2  [employer] Distance: 8.54 miles      In-Person, Phone/Virtual   [address] [place] [address]  Language: English  Hours: Mon - Sun Appt. Only  Fees: Free   Phone: (847) 794-7636 Email: tamia@[employer] Website: http://www.communityhelpinghand.org          Important Numbers & Websites       Emergency Services   911  City Services   311  Poison Control   (148) 963-6821  Suicide Prevention Lifeline   (925) 724-6141 (TALK)  Child Abuse Hotline   (981) 928-2440 (4-A-Child)  Sexual Assault Hotline   (294) 311-5050 (HOPE)  National Runaway Safeline   (604) 171-8009 (RUNAWAY)  All-Options Talkline   (178) 720-7029  Substance Abuse Referral   (548) 888-4180 (HELP)

## 2024-02-14 ENCOUNTER — OFFICE VISIT (OUTPATIENT)
Dept: FAMILY MEDICINE | Facility: CLINIC | Age: 19
End: 2024-02-14
Payer: COMMERCIAL

## 2024-02-14 VITALS
TEMPERATURE: 99 F | DIASTOLIC BLOOD PRESSURE: 69 MMHG | HEART RATE: 88 BPM | OXYGEN SATURATION: 97 % | RESPIRATION RATE: 16 BRPM | WEIGHT: 123.8 LBS | SYSTOLIC BLOOD PRESSURE: 105 MMHG | BODY MASS INDEX: 19.89 KG/M2 | HEIGHT: 66 IN

## 2024-02-14 DIAGNOSIS — Z00.00 ROUTINE GENERAL MEDICAL EXAMINATION AT A HEALTH CARE FACILITY: Primary | ICD-10-CM

## 2024-02-14 DIAGNOSIS — Z11.59 NEED FOR HEPATITIS C SCREENING TEST: ICD-10-CM

## 2024-02-14 DIAGNOSIS — Z00.129 ENCOUNTER FOR ROUTINE CHILD HEALTH EXAMINATION W/O ABNORMAL FINDINGS: ICD-10-CM

## 2024-02-14 DIAGNOSIS — Z30.011 OCP (ORAL CONTRACEPTIVE PILLS) INITIATION: ICD-10-CM

## 2024-02-14 DIAGNOSIS — F43.21 ADJUSTMENT DISORDER WITH DEPRESSED MOOD: ICD-10-CM

## 2024-02-14 DIAGNOSIS — J45.40 MODERATE PERSISTENT ASTHMA WITHOUT COMPLICATION: ICD-10-CM

## 2024-02-14 DIAGNOSIS — Z11.3 SCREEN FOR STD (SEXUALLY TRANSMITTED DISEASE): ICD-10-CM

## 2024-02-14 PROCEDURE — 92551 PURE TONE HEARING TEST AIR: CPT | Performed by: FAMILY MEDICINE

## 2024-02-14 PROCEDURE — 86803 HEPATITIS C AB TEST: CPT | Performed by: FAMILY MEDICINE

## 2024-02-14 PROCEDURE — 36415 COLL VENOUS BLD VENIPUNCTURE: CPT | Performed by: FAMILY MEDICINE

## 2024-02-14 PROCEDURE — 87591 N.GONORRHOEAE DNA AMP PROB: CPT | Performed by: FAMILY MEDICINE

## 2024-02-14 PROCEDURE — 99213 OFFICE O/P EST LOW 20 MIN: CPT | Mod: 25 | Performed by: FAMILY MEDICINE

## 2024-02-14 PROCEDURE — 99173 VISUAL ACUITY SCREEN: CPT | Mod: 59 | Performed by: FAMILY MEDICINE

## 2024-02-14 PROCEDURE — 87389 HIV-1 AG W/HIV-1&-2 AB AG IA: CPT | Performed by: FAMILY MEDICINE

## 2024-02-14 PROCEDURE — 87491 CHLMYD TRACH DNA AMP PROBE: CPT | Performed by: FAMILY MEDICINE

## 2024-02-14 PROCEDURE — 96127 BRIEF EMOTIONAL/BEHAV ASSMT: CPT | Performed by: FAMILY MEDICINE

## 2024-02-14 PROCEDURE — 86780 TREPONEMA PALLIDUM: CPT | Performed by: FAMILY MEDICINE

## 2024-02-14 PROCEDURE — 99395 PREV VISIT EST AGE 18-39: CPT | Performed by: FAMILY MEDICINE

## 2024-02-14 RX ORDER — FLUTICASONE PROPIONATE AND SALMETEROL XINAFOATE 230; 21 UG/1; UG/1
2 AEROSOL, METERED RESPIRATORY (INHALATION) 2 TIMES DAILY
Qty: 12 G | Refills: 1 | Status: SHIPPED | OUTPATIENT
Start: 2024-02-14

## 2024-02-14 RX ORDER — NORGESTIMATE AND ETHINYL ESTRADIOL 0.25-0.035
1 KIT ORAL DAILY
Qty: 90 TABLET | Refills: 3 | Status: SHIPPED | OUTPATIENT
Start: 2024-02-14 | End: 2024-06-27

## 2024-02-14 ASSESSMENT — PATIENT HEALTH QUESTIONNAIRE - PHQ9
SUM OF ALL RESPONSES TO PHQ QUESTIONS 1-9: 7
SUM OF ALL RESPONSES TO PHQ QUESTIONS 1-9: 7
10. IF YOU CHECKED OFF ANY PROBLEMS, HOW DIFFICULT HAVE THESE PROBLEMS MADE IT FOR YOU TO DO YOUR WORK, TAKE CARE OF THINGS AT HOME, OR GET ALONG WITH OTHER PEOPLE: SOMEWHAT DIFFICULT

## 2024-02-14 NOTE — PROGRESS NOTES
Preventive Care Visit  [facility]  Heather Rm MD, Family Medicine  Feb 14, 2024    1. Routine general medical examination at a health care facility  Kristen comes in today for routine physical.  She is due for tetanus this August and prefers to wait to come back in at that time.  She would like to do STD screening today.  We discussed she will be due for Pap smear at her next physical.    2. Moderate persistent asthma without complication  Her ACT is only 18 today.  She states most the time her asthma is under good control except at higher altitude where she has been living in [place] this winter.  She does not feel like she needs to do anything different with her medication.  - fluticasone-salmeterol (ADVAIR-HFA) 230-21 MCG/ACT inhaler; Inhale 2 puffs into the lungs 2 times daily  Dispense: 12 g; Refill: 1    3. OCP (oral contraceptive pills) initiation  She would like to restart birth control pills.  She has been on them before and knows how to start them.  - norgestimate-ethinyl estradiol (ORTHO-CYCLEN) 0.25-35 MG-MCG tablet; Take 1 tablet by mouth daily  Dispense: 90 tablet; Refill: 3    4. Need for hepatitis C screening test    - Hepatitis C Screen Reflex to HCV RNA Quant and Genotype; Future  - Hepatitis C Screen Reflex to HCV RNA Quant and Genotype    5. Screen for STD (sexually transmitted disease)  She would like to do STD screenings.  No recent exposures or concerns.  - HIV Antigen Antibody Combo Cascade; Future  - Treponema Abs w Reflex to RPR and Titer; Future  - Chlamydia trachomatis/Neisseria gonorrhoeae by PCR; Future  - HIV Antigen Antibody Combo Cascade  - Treponema Abs w Reflex to RPR and Titer  - Chlamydia trachomatis/Neisseria gonorrhoeae by PCR    6. Adjustment disorder with depressed mood  She is having just some mild depression on her PHQ-9 equals 7.  She is seeing a therapist.  She is not really interested in medication.  She does not feel suicidal and feels  like she has a good support system.      Subjective   Kristen is a 18 year old, presenting for the following:  Physical (STD screen. Birth control )        2/14/2024    10:47 AM   Additional Questions   Roomed by         Health Care Directive  Patient does not have a Health Care Directive or Living Will: Discussed advance care planning with patient; however, patient declined at this time.    HPIAnswers submitted by the patient for this visit:  Patient Health Questionnaire (Submitted on 2/14/2024)  If you checked off any problems, how difficult have these problems made it for you to do your work, take care of things at home, or get along with other people?: Somewhat difficult  PHQ9 TOTAL SCORE: 7    Answers submitted by the patient for this visit:  Patient Health Questionnaire (Submitted on 2/14/2024)  If you checked off any problems, how difficult have these problems made it for you to do your work, take care of things at home, or get along with other people?: Somewhat difficult  PHQ9 TOTAL SCORE: 7               No data to display                   No data to display                  2/11/2024   Exercise   Days per week of moderate/strenous exercise 4 days   Average minutes spent exercising at this level 90 min         2/11/2024   Social Factors   Worry food won't last until get money to buy more No   Food not last or not have enough money for food? No   Do you have housing?  Yes   Are you worried about losing your housing? Yes   Lack of transportation? No   (!) HOUSING CONCERN PRESENT       No data to display                   No data to display                Today's PHQ-9 Score:       2/14/2024    10:26 AM   PHQ-9 SCORE   PHQ-9 Total Score MyChart 7 (Mild depression)   PHQ-9 Total Score 7          No data to display              Social History     Tobacco Use    Smoking status: Never     Passive exposure: Yes    Smokeless tobacco: Never    Tobacco comments:     i use vapes passivley - if friends have them -  "trying to quit at the moment   Vaping Use    Vaping Use: Every day    Substances: Nicotine, Flavoring    Devices: Disposable, Pre-filled or refillable cartridge, Refillable tank, Pre-filled pod   Substance Use Topics    Alcohol use: Yes     Comment: 2-4 days a week    Drug use: Yes     Types: Marijuana     Comment: 1-3x a week         History of abnormal Pap smear: not due yet              No data to display                 Reviewed and updated as needed this visit by Provider                          Review of Systems  Constitutional, neuro, ENT, endocrine, pulmonary, cardiac, gastrointestinal, genitourinary, musculoskeletal, integument and psychiatric systems are negative, except as otherwise noted.     Objective    Exam  /69   Pulse 88   Temp 99  F (37.2  C)   Resp 16   Ht 1.683 m (5' 6.25\")   Wt 56.2 kg (123 lb 12.8 oz)   LMP 02/13/2024   SpO2 97%   BMI 19.83 kg/m     Estimated body mass index is 19.83 kg/m  as calculated from the following:    Height as of this encounter: 1.683 m (5' 6.25\").    Weight as of this encounter: 56.2 kg (123 lb 12.8 oz).    Physical Exam  GENERAL: alert and no distress  EYES: Eyes grossly normal to inspection, PERRL and conjunctivae and sclerae normal  HENT: ear canals and TM's normal, nose and mouth without ulcers or lesions  NECK: no adenopathy, no asymmetry, masses, or scars  RESP: lungs clear to auscultation - no rales, rhonchi or wheezes  CV: regular rate and rhythm, normal S1 S2, no S3 or S4, no murmur, click or rub, no peripheral edema  ABDOMEN: soft, nontender, no hepatosplenomegaly, no masses and bowel sounds normal  MS: no gross musculoskeletal defects noted, no edema  SKIN: no suspicious lesions or rashes  NEURO: Normal strength and tone, mentation intact and speech normal  PSYCH: mentation appears normal, affect normal/bright  : Exam declined by parent/patient.  Reason for decline: Patient/Parental preference      Vision Screen  Vision Screen Details  Does " the patient have corrective lenses (glasses/contacts)?: No  No Corrective Lenses, PLUS LENS REQUIRED: Pass  Vision Acuity Screen  Vision Acuity Tool: Nguyễn  RIGHT EYE: 10/10 (20/20)  LEFT EYE: 10/10 (20/20)  Is there a two line difference?: No  Vision Screen Results: Pass    Hearing Screen  RIGHT EAR  1000 Hz on Level 40 dB (Conditioning sound): Pass  1000 Hz on Level 20 dB: Pass  2000 Hz on Level 20 dB: Pass  4000 Hz on Level 20 dB: Pass  6000 Hz on Level 20 dB: Pass  8000 Hz on Level 20 dB: Pass  LEFT EAR  8000 Hz on Level 20 dB: Pass  6000 Hz on Level 20 dB: Pass  4000 Hz on Level 20 dB: Pass  2000 Hz on Level 20 dB: Pass  1000 Hz on Level 20 dB: Pass  500 Hz on Level 25 dB: Pass  RIGHT EAR  500 Hz on Level 25 dB: Pass  Results  Hearing Screen Results: Pass      Signed Electronically by: Heather Rm MD

## 2024-02-14 NOTE — PATIENT INSTRUCTIONS
Patient Education    [facility]  18 THROUGH 21 YEAR VISITS  Here are some suggestions from [employer] experts that may be of value to your family.     HOW YOU ARE DOING  Enjoy spending time with your family.  Find activities you are really interested in, such as sports, theater, or volunteering.  Try to be responsible for your schoolwork or work obligations.  Always talk through problems and never use violence.  If you get angry with someone, try to walk away.  If you feel unsafe in your home or have been hurt by someone, let us know. Hotlines and community agencies can also provide confidential help.  Talk with us if you are worried about your living or food situation. Community agencies and programs such as SNAP can help.  Don t smoke, vape, or use drugs. Avoid people who do when you can. Talk with us if you are worried about alcohol or drug use in your family.    YOUR DAILY LIFE  Visit the dentist at least twice a year.  Brush your teeth at least twice a day and floss once a day.  Be a healthy eater.  Have vegetables, fruits, lean protein, and whole grains at meals and snacks.  Limit fatty, sugary, salty foods that are low in nutrients, such as candy, chips, and ice cream.  Eat when you re hungry. Stop when you feel satisfied.  Eat breakfast.  Drink plenty of water.  Make sure to get enough calcium every day.  Have 3 or more servings of low-fat (1%) or fat-free milk and other low-fat dairy products, such as yogurt and cheese.  Women: Make sure to eat foods rich in folate, such as fortified grains and dark- green leafy vegetables.  Aim for at least 1 hour of physical activity every day.  Wear safety equipment when you play sports.  Get enough sleep.  Talk with us about managing your health care and insurance as an adult.    YOUR FEELINGS  Most people have ups and downs. If you are feeling sad, depressed, nervous, irritable, hopeless, or angry, let us know or reach out to another health  care professional.  Figure out healthy ways to deal with stress.  Try your best to solve problems and make decisions on your own.  Sexuality is an important part of your life. If you have any questions or concerns, we are here for you.    HEALTHY BEHAVIOR CHOICES  Avoid using drugs, alcohol, tobacco, steroids, and diet pills. Support friends who choose not to use.  If you use drugs or alcohol, let us know or talk with another trusted adult about it. We can help you with quitting or cutting down on your use.  Make healthy decisions about your sexual behavior.  If you are sexually active, always practice safe sex. Always use birth control along with a condom to prevent pregnancy and sexually transmitted infections.  All sexual activity should be something you want. No one should ever force or try to convince you.  Protect your hearing at work, home, and concerts. Keep your earbud volume down.    STAYING SAFE  Always be a safe and cautious .  Insist that everyone use a lap and shoulder seat belt.  Limit the number of friends in the car and avoid driving at night.  Avoid distractions. Never text or talk on the phone while you drive.  Do not ride in a vehicle with someone who has been using drugs or alcohol.  If you feel unsafe driving or riding with someone, call someone you trust to drive you.  Wear helmets and protective gear while playing sports. Wear a helmet when riding a bike, a motorcycle, or an ATV or when skiing or skateboarding.  Always use sunscreen and a hat when you re outside.  Fighting and carrying weapons can be dangerous. Talk with your parents, teachers, or doctor about how to avoid these situations.        Consistent with Bright Futures: Guidelines for Health Supervision of Infants, Children, and Adolescents, 4th Edition  For more information, go to https://brightfutures.aap.org.

## 2024-02-15 LAB
C TRACH DNA SPEC QL PROBE+SIG AMP: NEGATIVE
HCV AB SERPL QL IA: NONREACTIVE
HIV 1+2 AB+HIV1 P24 AG SERPL QL IA: NONREACTIVE
N GONORRHOEA DNA SPEC QL NAA+PROBE: NEGATIVE
T PALLIDUM AB SER QL: NONREACTIVE

## 2024-05-15 ENCOUNTER — TELEPHONE (OUTPATIENT)
Dept: FAMILY MEDICINE | Facility: CLINIC | Age: 19
End: 2024-05-15

## 2024-05-15 ENCOUNTER — LAB (OUTPATIENT)
Dept: LAB | Facility: CLINIC | Age: 19
End: 2024-05-15
Payer: COMMERCIAL

## 2024-05-15 DIAGNOSIS — N94.6 DYSMENORRHEA: ICD-10-CM

## 2024-05-15 DIAGNOSIS — E55.9 AVITAMINOSIS D: ICD-10-CM

## 2024-05-15 DIAGNOSIS — R51.9 FACIAL PAIN: ICD-10-CM

## 2024-05-15 DIAGNOSIS — R53.83 FATIGUE: ICD-10-CM

## 2024-05-15 DIAGNOSIS — F41.9 ANXIETY DISORDER OF CHILDHOOD OR ADOLESCENCE: ICD-10-CM

## 2024-05-15 LAB
FERRITIN SERPL-MCNC: 54 NG/ML (ref 6–175)
IRON BINDING CAPACITY (ROCHE): 335 UG/DL (ref 240–430)
IRON SATN MFR SERPL: 22 % (ref 15–46)
IRON SERPL-MCNC: 75 UG/DL (ref 37–145)

## 2024-05-15 PROCEDURE — 82728 ASSAY OF FERRITIN: CPT

## 2024-05-15 PROCEDURE — 36415 COLL VENOUS BLD VENIPUNCTURE: CPT

## 2024-05-15 PROCEDURE — 83550 IRON BINDING TEST: CPT

## 2024-05-15 PROCEDURE — 82652 VIT D 1 25-DIHYDROXY: CPT

## 2024-05-15 PROCEDURE — 83540 ASSAY OF IRON: CPT

## 2024-05-15 PROCEDURE — 82670 ASSAY OF TOTAL ESTRADIOL: CPT

## 2024-05-15 PROCEDURE — 82607 VITAMIN B-12: CPT

## 2024-05-15 PROCEDURE — 84403 ASSAY OF TOTAL TESTOSTERONE: CPT

## 2024-05-15 PROCEDURE — 84144 ASSAY OF PROGESTERONE: CPT

## 2024-05-15 NOTE — TELEPHONE ENCOUNTER
A user error has taken place: encounter opened in error, closed for administrative reasons.   Carlos Eduardo Landry RN

## 2024-05-16 LAB
1,25(OH)2D SERPL-MCNC: 42.6 PG/ML (ref 19.9–79.3)
ESTRADIOL SERPL-MCNC: 77 PG/ML
PROGEST SERPL-MCNC: 14.7 NG/ML
VIT B12 SERPL-MCNC: 682 PG/ML (ref 232–1245)

## 2024-05-17 LAB — TESTOST SERPL-MCNC: 19 NG/DL (ref 8–60)

## 2024-06-27 ENCOUNTER — MYC REFILL (OUTPATIENT)
Dept: FAMILY MEDICINE | Facility: CLINIC | Age: 19
End: 2024-06-27
Payer: COMMERCIAL

## 2024-06-27 DIAGNOSIS — Z30.011 OCP (ORAL CONTRACEPTIVE PILLS) INITIATION: ICD-10-CM

## 2024-06-27 RX ORDER — NORGESTIMATE AND ETHINYL ESTRADIOL 0.25-0.035
1 KIT ORAL DAILY
Qty: 90 TABLET | Refills: 3 | Status: SHIPPED | OUTPATIENT
Start: 2024-06-27 | End: 2024-08-04

## 2024-07-02 ENCOUNTER — E-VISIT (OUTPATIENT)
Dept: URGENT CARE | Facility: CLINIC | Age: 19
End: 2024-07-02
Payer: COMMERCIAL

## 2024-07-02 DIAGNOSIS — N94.9 VAGINAL DISCOMFORT: Primary | ICD-10-CM

## 2024-07-02 PROCEDURE — 99207 PR NON-BILLABLE SERV PER CHARTING: CPT | Performed by: NURSE PRACTITIONER

## 2024-07-06 NOTE — PATIENT INSTRUCTIONS
Dear Sandra Moraes,    We are sorry you are not feeling well. Based on the responses you provided, it is recommended that you be seen in-person in urgent care so we can better evaluate your symptoms. Please click here to find the nearest urgent care location to you.   You will not be charged for this Visit. Thank you for trusting us with your care.    Todd Gallegos NP

## 2024-07-29 ENCOUNTER — E-VISIT (OUTPATIENT)
Dept: URGENT CARE | Facility: URGENT CARE | Age: 19
End: 2024-07-29
Payer: COMMERCIAL

## 2024-07-29 ENCOUNTER — E-VISIT (OUTPATIENT)
Dept: URGENT CARE | Facility: CLINIC | Age: 19
End: 2024-07-29
Payer: COMMERCIAL

## 2024-07-29 DIAGNOSIS — N39.0 ACUTE UTI (URINARY TRACT INFECTION): Primary | ICD-10-CM

## 2024-07-29 DIAGNOSIS — B37.31 CANDIDAL VULVOVAGINITIS: Primary | ICD-10-CM

## 2024-07-29 PROCEDURE — 99207 PR NON-BILLABLE SERV PER CHARTING: CPT | Performed by: FAMILY MEDICINE

## 2024-07-29 PROCEDURE — 99421 OL DIG E/M SVC 5-10 MIN: CPT | Performed by: PHYSICIAN ASSISTANT

## 2024-07-29 RX ORDER — FLUCONAZOLE 150 MG/1
150 TABLET ORAL ONCE
Qty: 2 TABLET | Refills: 0 | Status: SHIPPED | OUTPATIENT
Start: 2024-07-29 | End: 2024-07-29

## 2024-07-29 RX ORDER — NITROFURANTOIN 25; 75 MG/1; MG/1
100 CAPSULE ORAL 2 TIMES DAILY
Qty: 10 CAPSULE | Refills: 0 | Status: SHIPPED | OUTPATIENT
Start: 2024-07-29 | End: 2024-08-03

## 2024-07-29 NOTE — PATIENT INSTRUCTIONS
Dear Sandra Moraes    After reviewing your responses, I've been able to diagnose you with a urinary tract infection, which is a common infection of the bladder with bacteria.  This is not a sexually transmitted infection, though urinating immediately after intercourse can help prevent infections.  Drinking lots of fluids is also helpful to clear your current infection and prevent the next one.      I have sent a prescription for antibiotics to your pharmacy to treat this infection.    It is important that you take all of your prescribed medication even if your symptoms are improving after a few doses.  Taking all of your medicine helps prevent the symptoms from returning.     If your symptoms worsen, you develop pain in your back or stomach, develop fevers, or are not improving in 5 days, please contact your primary care provider for an appointment or visit any of our convenient Walk-in or Urgent Care Centers to be seen, which can be found on our website here.    Thanks again for choosing us as your health care partner,    Lesley Morales PA-C  Urinary Tract Infection (UTI) in Women: Care Instructions  Overview     A urinary tract infection (UTI) is an infection caused by bacteria. It can happen anywhere in the urinary tract. A UTI can happen in the:  Kidneys.  Ureters, the tubes that connect the kidneys to the bladder.  Bladder.  Urethra, where the urine comes out.  Most UTIs are bladder infections. They often cause pain or burning when you urinate.  Most UTIs can be cured with antibiotics. If you are prescribed antibiotics, be sure to complete your treatment so that the infection does not get worse.  Follow-up care is a key part of your treatment and safety. Be sure to make and go to all appointments, and call your doctor if you are having problems. It's also a good idea to know your test results and keep a list of the medicines you take.  How can you care for yourself at home?  Take your antibiotics as directed.  "Do not stop taking them just because you feel better. You need to take the full course of antibiotics.  Drink extra water and other fluids for the next day or two. This will help make the urine less concentrated and help wash out the bacteria that are causing the infection. (If you have kidney, heart, or liver disease and have to limit fluids, talk with your doctor before you increase the amount of fluids you drink.)  Avoid drinks that are carbonated or have caffeine. They can irritate the bladder.  Urinate often. Try to empty your bladder each time.  To relieve pain, take a hot bath or lay a heating pad set on low over your lower belly or genital area. Never go to sleep with a heating pad in place.  To prevent UTIs  Drink plenty of water each day. This helps you urinate often, which clears bacteria from your system. (If you have kidney, heart, or liver disease and have to limit fluids, talk with your doctor before you increase the amount of fluids you drink.)  Urinate when you need to.  If you are sexually active, urinate right after you have sex.  Change sanitary pads often.  Avoid douches, bubble baths, feminine hygiene sprays, and other feminine hygiene products that have deodorants.  After going to the bathroom, wipe from front to back.  When should you call for help?   Call your doctor now or seek immediate medical care if:    You have new or worse fever, chills, nausea, or vomiting.     You have new pain in your back just below your rib cage. This is called flank pain.     There is new blood or pus in your urine.     You have any problems with your antibiotic medicine.   Watch closely for changes in your health, and be sure to contact your doctor if:    You are not getting better after taking an antibiotic for 2 days.     Your symptoms go away but then come back.   Where can you learn more?  Go to https://www.healthwise.net/patiented  Enter K848 in the search box to learn more about \"Urinary Tract Infection " "(UTI) in Women: Care Instructions.\"  Current as of: November 15, 2023               Content Version: 14.0    7452-5651 [employer].   Care instructions adapted under license by your healthcare professional. If you have questions about a medical condition or this instruction, always ask your healthcare professional. [employer] disclaims any warranty or liability for your use of this information.      "

## 2024-07-29 NOTE — PATIENT INSTRUCTIONS
Thank you for choosing us for your care. I have placed an order for a prescription so that you can start treatment. View your full visit summary for details by clicking on the link below. Your pharmacist will able to address any questions you may have about the medication.     If you re not feeling better within 2-3 days, please schedule an appointment.  You can schedule an appointment right here in [facility], or call [phone]  If the visit is for the same symptoms as your eVisit, we ll refund the cost of your eVisit if seen within seven days.    Vaginal Yeast Infection: Care Instructions  Overview     A vaginal yeast infection is the growth of too many yeast cells in the vagina. This is a common problem. Itching, vaginal discharge and irritation, and other symptoms can bother you. But yeast infections don't often cause other health problems.  Some medicines can increase your risk of getting a yeast infection. These include antibiotics, hormones, and steroids. You may also be more likely to get a yeast infection if you are pregnant, have diabetes, douche, or wear tight clothes.  With treatment, most yeast infections get better in a few days.  Follow-up care is a key part of your treatment and safety. Be sure to make and go to all appointments, and call your doctor if you are having problems. It's also a good idea to know your test results and keep a list of the medicines you take.  How can you care for yourself at home?  Take your medicines exactly as prescribed. Call your doctor if you think you are having a problem with your medicine.  Ask your doctor about over-the-counter (OTC) medicines for yeast infections. If you use an OTC treatment, read and follow all instructions on the label.  Don't use tampons while using a vaginal cream or suppository. The tampons can absorb the medicine. Use pads instead.  Wear loose cotton clothing. Don't wear nylon or other fabric that holds body heat and moisture close to the  "skin.  Try sleeping without underwear.  Don't scratch. Relieve itching with a cold pack or a cool bath.  Don't wash your vulva more than once a day. Use plain water or a mild, unscented soap. Air-dry the vulva.  Change out of wet or damp clothes as soon as possible.  If you are using a vaginal medicine, don't have sex until you have finished your treatment. But if you do have sex, don't depend on a condom or diaphragm for birth control. The oil in some vaginal medicines weakens latex.  Don't douche or use powders, sprays, or perfumes in your vagina or on your vulva. These items can change the normal balance of organisms in your vagina.  When should you call for help?   Call your doctor now or seek immediate medical care if:    You have new or increased pain in your vagina or pelvis.   Watch closely for changes in your health, and be sure to contact your doctor if:    You have unexpected vaginal bleeding.     You have a fever.     You are not getting better after 2 days.     Your symptoms come back after you finish your medicines.   Where can you learn more?  Go to https://www.[employer].net/patiented  Enter F639 in the search box to learn more about \"Vaginal Yeast Infection: Care Instructions.\"  Current as of: November 27, 2023               Content Version: 14.0    9830-1256 [employer].   Care instructions adapted under license by your healthcare professional. If you have questions about a medical condition or this instruction, always ask your healthcare professional. [employer] disclaims any warranty or liability for your use of this information.      "

## 2024-08-04 ENCOUNTER — MYC REFILL (OUTPATIENT)
Dept: FAMILY MEDICINE | Facility: CLINIC | Age: 19
End: 2024-08-04
Payer: COMMERCIAL

## 2024-08-04 DIAGNOSIS — Z30.011 OCP (ORAL CONTRACEPTIVE PILLS) INITIATION: ICD-10-CM

## 2024-08-05 RX ORDER — NORGESTIMATE AND ETHINYL ESTRADIOL 0.25-0.035
1 KIT ORAL DAILY
Qty: 90 TABLET | Refills: 3 | Status: SHIPPED | OUTPATIENT
Start: 2024-08-05

## 2024-08-06 PROBLEM — F41.0 PANIC ATTACK: Status: ACTIVE | Noted: 2024-08-06

## 2024-08-06 PROBLEM — M54.9 BACK PAIN: Status: ACTIVE | Noted: 2024-08-06

## 2024-08-06 PROBLEM — E61.1 IRON DEFICIENCY: Status: ACTIVE | Noted: 2024-08-06

## 2024-08-06 PROBLEM — R07.9 CHEST PAIN: Status: ACTIVE | Noted: 2024-08-06

## 2024-08-06 PROBLEM — Z74.09 REDUCED MOBILITY: Status: ACTIVE | Noted: 2024-08-06

## 2024-08-06 PROBLEM — U07.1 COVID-19: Status: ACTIVE | Noted: 2020-08-05

## 2024-08-06 PROBLEM — R14.0 ABDOMINAL BLOATING: Status: ACTIVE | Noted: 2024-08-06

## 2024-08-06 PROBLEM — F90.0 ATTENTION DEFICIT HYPERACTIVITY DISORDER (ADHD), PREDOMINANTLY INATTENTIVE TYPE: Status: ACTIVE | Noted: 2022-02-17

## 2024-08-06 PROBLEM — R19.8 IRREGULAR BOWEL HABITS: Status: ACTIVE | Noted: 2024-08-06

## 2024-08-06 PROBLEM — R19.8 DIGESTIVE SYMPTOM: Status: ACTIVE | Noted: 2022-10-28

## 2024-08-06 PROBLEM — R14.0 ABDOMINAL DISTENSION, GASEOUS: Status: ACTIVE | Noted: 2022-10-28

## 2024-08-06 PROBLEM — G89.29 CHRONIC PAIN: Status: ACTIVE | Noted: 2024-08-06

## 2024-08-07 ENCOUNTER — OFFICE VISIT (OUTPATIENT)
Dept: OBGYN | Facility: CLINIC | Age: 19
End: 2024-08-07
Attending: NURSE PRACTITIONER
Payer: COMMERCIAL

## 2024-08-07 VITALS
HEART RATE: 80 BPM | SYSTOLIC BLOOD PRESSURE: 115 MMHG | HEIGHT: 66 IN | WEIGHT: 127 LBS | BODY MASS INDEX: 20.41 KG/M2 | DIASTOLIC BLOOD PRESSURE: 78 MMHG

## 2024-08-07 DIAGNOSIS — R82.998 LEUKOCYTES IN URINE: ICD-10-CM

## 2024-08-07 DIAGNOSIS — Z11.3 SCREEN FOR STD (SEXUALLY TRANSMITTED DISEASE): ICD-10-CM

## 2024-08-07 DIAGNOSIS — N39.0 RECURRENT UTI: Primary | ICD-10-CM

## 2024-08-07 LAB
ALBUMIN UR-MCNC: NEGATIVE MG/DL
APPEARANCE UR: CLEAR
BACTERIAL VAGINOSIS VAG-IMP: POSITIVE
BILIRUB UR QL STRIP: NEGATIVE
CANDIDA DNA VAG QL NAA+PROBE: NOT DETECTED
CANDIDA GLABRATA / CANDIDA KRUSEI DNA: NOT DETECTED
COLOR UR AUTO: YELLOW
GLUCOSE UR STRIP-MCNC: NEGATIVE MG/DL
HGB UR QL STRIP: ABNORMAL
KETONES UR STRIP-MCNC: NEGATIVE MG/DL
LEUKOCYTE ESTERASE UR QL STRIP: ABNORMAL
NITRATE UR QL: NEGATIVE
PH UR STRIP: 6 [PH] (ref 5–8)
SP GR UR STRIP: 1.02 (ref 1–1.03)
T VAGINALIS DNA VAG QL NAA+PROBE: NOT DETECTED
UROBILINOGEN UR STRIP-ACNC: 0.2 E.U./DL

## 2024-08-07 PROCEDURE — 81003 URINALYSIS AUTO W/O SCOPE: CPT | Performed by: NURSE PRACTITIONER

## 2024-08-07 PROCEDURE — 87086 URINE CULTURE/COLONY COUNT: CPT | Performed by: NURSE PRACTITIONER

## 2024-08-07 PROCEDURE — 0352U MULTIPLEX VAGINAL PANEL BY PCR: CPT | Performed by: NURSE PRACTITIONER

## 2024-08-07 PROCEDURE — 99214 OFFICE O/P EST MOD 30 MIN: CPT | Performed by: NURSE PRACTITIONER

## 2024-08-07 PROCEDURE — G0463 HOSPITAL OUTPT CLINIC VISIT: HCPCS | Performed by: NURSE PRACTITIONER

## 2024-08-07 PROCEDURE — 87491 CHLMYD TRACH DNA AMP PROBE: CPT | Performed by: NURSE PRACTITIONER

## 2024-08-07 PROCEDURE — 87591 N.GONORRHOEAE DNA AMP PROB: CPT | Performed by: NURSE PRACTITIONER

## 2024-08-07 NOTE — PROGRESS NOTES
Subjective:  Sandra Moraes is a 19 yr old female who presents to clinic for a consult for recurrent UTIs. Currently asymptomatic.     7/8/2024 UTI by UA treated with an antibiotic other than Macrobid  7/6/2024: Diagnosed with BV; neg gonorrhea & chlamydia. Did not have any vaginal symptoms at this time.  7/29/2024: E-visit; based on symptoms diagnosed with UTI and treated with Macrobid    Prior to this, she was last treated for a UTI via E-visit Jan 2024. She had 2 UTIs in 2023 as well.   She has been spending several months at a time living in [place] over the last couple yrs, so some records are in another state.     Symptoms have resolved completely after each round of antibiotics.   No symptoms today.   Trigger seems to be intercourse. Has a new partner and plans to be regularly sexually active.   Voiding after intercourse, cleans genital area, and sometimes showers. Takes cranberry tablets.    Contraception:  COCs    Patient's last menstrual period was 08/01/2024 (exact date).  Denies AUB or post-coital bleeding.   Denies vaginal itching, odor, or change in discharge    Past Medical History:   Diagnosis Date    Depressive disorder 2019    Uncomplicated asthma 2005     Past Surgical History:   Procedure Laterality Date    ENT SURGERY  11/2021    tonsillectomy     Family History   Problem Relation Age of Onset    Thyroid Disease Mother     Depression Father     Anxiety Disorder Father     Mental Illness Father       No Known Allergies  Current Outpatient Medications   Medication Sig Dispense Refill    albuterol (PROAIR HFA/PROVENTIL HFA/VENTOLIN HFA) 108 (90 Base) MCG/ACT inhaler Inhale 2 puffs into the lungs every 6 hours as needed for shortness of breath, wheezing or cough 18 g 0    Ferrous Sulfate (IRON) 28 MG TABS Take by mouth daily      fluticasone-salmeterol (ADVAIR-HFA) 230-21 MCG/ACT inhaler Inhale 2 puffs into the lungs 2 times daily 12 g 1    norgestimate-ethinyl estradiol (ORTHO-CYCLEN) 0.25-35 MG-MCG  "tablet Take 1 tablet by mouth daily 90 tablet 3    PROGESTERONE 100MG/G CREAM Apply topically daily (Patient not taking: Reported on 8/7/2024)       No current facility-administered medications for this visit.     Objective:  /78   Pulse 80   Ht 1.683 m (5' 6.25\")   Wt 57.6 kg (127 lb)   LMP 08/01/2024 (Exact Date)   BMI 20.34 kg/m    General: pleasant female in no acute distress  Psych: normal mentation, well oriented  Respiratory:  Unlabored breathing  Musculoskeletal: no gross deformities    UA RESULTS:  Recent Labs   Lab Test 08/07/24  1144   COLOR Yellow   APPEARANCE Clear   URINEGLC Negative   URINEBILI Negative   URINEKETONE Negative   SG 1.025   UBLD Moderate*   URINEPH 6.0   PROTEIN Negative   UROBILINOGEN 0.2   NITRITE Negative   LEUKEST Small*     Assessment/ Plan:  Encounter Diagnoses   Name Primary?    Recurrent UTI Yes    Leukocytes in urine     Screen for STD (sexually transmitted disease)      Kristen is a 19 yr old female with 3 UTIs treated in the last 3 yrs, only 1 evaluated through lab (UA). She has not had a UC done (per chart review).  Trigger seems to be intercourse. Asymptomatic today, but UA does show moderate blood and small leukocytes. Recommend urine culture as well as repeating gonorrhea, chlamydia, and vaginal multiplex given UA result, recent new partner, and BV diagnosed without symptoms. Will treat based on results.   If negative, offered pt post-coital antibiotics; she prefers to avoid this.  Recommended continued cranberry supplementation as well as addition of D-mannose.     Further plan for care will be developed based on results.  Pt expressed understanding and agreement with the plan for care.  A total of 30 minutes was spent in patient care, chart review, and documentation on the DOS  Adriana Ibanez, DNP, APRN, WHNP            "

## 2024-08-07 NOTE — PATIENT INSTRUCTIONS
Thank you for trusting us with your care!   Please be aware, if you are on Mychart, you may see your results prior to your providers review. If labs are abnormal, we will call or message you on [employer] with a follow up plan.    If you need to contact us for questions about:  Symptoms, Scheduling & Medical Questions; Non-urgent (2-3 day response) [employer] message, Urgent (needing response today) [phone] (if after 3:30pm next day response)   Prescriptions: Please call your Pharmacy   Billing: [facility] [phone] or [facility]:[phone]

## 2024-08-07 NOTE — LETTER
8/7/2024       RE: Sandra Moraes  [address]  [place] [address]     Dear Colleague,    Thank you for referring your patient, Sandra Moraes, to the [facility] [place] [facility]. Please see a copy of my visit note below.    Subjective:  Sandra Moraes is a 19 yr old female who presents to clinic for a consult for recurrent UTIs. Currently asymptomatic.     7/8/2024 UTI by UA treated with an antibiotic other than Macrobid  7/6/2024: Diagnosed with BV; neg gonorrhea & chlamydia. Did not have any vaginal symptoms at this time.  7/29/2024: E-visit; based on symptoms diagnosed with UTI and treated with Macrobid    Prior to this, she was last treated for a UTI via E-visit Jan 2024. She had 2 UTIs in 2023 as well.   She has been spending several months at a time living in [place] over the last couple yrs, so some records are in another state.     Symptoms have resolved completely after each round of antibiotics.   No symptoms today.   Trigger seems to be intercourse. Has a new partner and plans to be regularly sexually active.   Voiding after intercourse, cleans genital area, and sometimes showers. Takes cranberry tablets.    Contraception:  COCs    Patient's last menstrual period was 08/01/2024 (exact date).  Denies AUB or post-coital bleeding.   Denies vaginal itching, odor, or change in discharge    Past Medical History:   Diagnosis Date     Depressive disorder 2019     Uncomplicated asthma 2005     Past Surgical History:   Procedure Laterality Date     ENT SURGERY  11/2021    tonsillectomy     Family History   Problem Relation Age of Onset     Thyroid Disease Mother      Depression Father      Anxiety Disorder Father      Mental Illness Father       No Known Allergies  Current Outpatient Medications   Medication Sig Dispense Refill     albuterol (PROAIR HFA/PROVENTIL HFA/VENTOLIN HFA) 108 (90 Base) MCG/ACT inhaler Inhale 2 puffs  "into the lungs every 6 hours as needed for shortness of breath, wheezing or cough 18 g 0     Ferrous Sulfate (IRON) 28 MG TABS Take by mouth daily       fluticasone-salmeterol (ADVAIR-HFA) 230-21 MCG/ACT inhaler Inhale 2 puffs into the lungs 2 times daily 12 g 1     norgestimate-ethinyl estradiol (ORTHO-CYCLEN) 0.25-35 MG-MCG tablet Take 1 tablet by mouth daily 90 tablet 3     PROGESTERONE 100MG/G CREAM Apply topically daily (Patient not taking: Reported on 8/7/2024)       No current facility-administered medications for this visit.     Objective:  /78   Pulse 80   Ht 1.683 m (5' 6.25\")   Wt 57.6 kg (127 lb)   LMP 08/01/2024 (Exact Date)   BMI 20.34 kg/m    General: pleasant female in no acute distress  Psych: normal mentation, well oriented  Respiratory:  Unlabored breathing  Musculoskeletal: no gross deformities    UA RESULTS:  Recent Labs   Lab Test 08/07/24  1144   COLOR Yellow   APPEARANCE Clear   URINEGLC Negative   URINEBILI Negative   URINEKETONE Negative   SG 1.025   UBLD Moderate*   URINEPH 6.0   PROTEIN Negative   UROBILINOGEN 0.2   NITRITE Negative   LEUKEST Small*     Assessment/ Plan:  Encounter Diagnoses   Name Primary?     Recurrent UTI Yes     Leukocytes in urine      Screen for STD (sexually transmitted disease)      Kristen is a 19 yr old female with 3 UTIs treated in the last 3 yrs, only 1 evaluated through lab (UA). She has not had a UC done (per chart review).  Trigger seems to be intercourse. Asymptomatic today, but UA does show moderate blood and small leukocytes. Recommend urine culture as well as repeating gonorrhea, chlamydia, and vaginal multiplex given UA result, recent new partner, and BV diagnosed without symptoms. Will treat based on results.   If negative, offered pt post-coital antibiotics; she prefers to avoid this.  Recommended continued cranberry supplementation as well as addition of D-mannose.     Further plan for care will be developed based on results.  Pt expressed " understanding and agreement with the plan for care.  A total of 30 minutes was spent in patient care, chart review, and documentation on the DOS  Adriana Ibanez DNP, APRN, WHROWENA              Again, thank you for allowing me to participate in the care of your patient.      Sincerely,    ALFRED Simmons CNP

## 2024-08-08 DIAGNOSIS — N76.0 BACTERIAL VAGINOSIS: Primary | ICD-10-CM

## 2024-08-08 DIAGNOSIS — B96.89 BACTERIAL VAGINOSIS: Primary | ICD-10-CM

## 2024-08-08 LAB
BACTERIA UR CULT: NORMAL
C TRACH DNA SPEC QL NAA+PROBE: NEGATIVE
N GONORRHOEA DNA SPEC QL NAA+PROBE: NEGATIVE

## 2024-08-08 RX ORDER — METRONIDAZOLE 7.5 MG/G
1 GEL VAGINAL DAILY
Qty: 25 G | Refills: 0 | Status: SHIPPED | OUTPATIENT
Start: 2024-08-08 | End: 2024-08-13

## 2024-09-23 ENCOUNTER — MYC MEDICAL ADVICE (OUTPATIENT)
Dept: FAMILY MEDICINE | Facility: CLINIC | Age: 19
End: 2024-09-23
Payer: COMMERCIAL

## 2024-10-04 ENCOUNTER — OFFICE VISIT (OUTPATIENT)
Dept: FAMILY MEDICINE | Facility: CLINIC | Age: 19
End: 2024-10-04
Payer: COMMERCIAL

## 2024-10-04 VITALS
DIASTOLIC BLOOD PRESSURE: 67 MMHG | SYSTOLIC BLOOD PRESSURE: 102 MMHG | WEIGHT: 125.6 LBS | HEART RATE: 81 BPM | TEMPERATURE: 98.7 F | RESPIRATION RATE: 16 BRPM | OXYGEN SATURATION: 100 % | BODY MASS INDEX: 20.18 KG/M2 | HEIGHT: 66 IN

## 2024-10-04 DIAGNOSIS — M54.89 OTHER CHRONIC BACK PAIN: ICD-10-CM

## 2024-10-04 DIAGNOSIS — G89.29 OTHER CHRONIC BACK PAIN: ICD-10-CM

## 2024-10-04 DIAGNOSIS — R19.8 IRREGULAR BOWEL HABITS: ICD-10-CM

## 2024-10-04 DIAGNOSIS — M25.50 POLYARTHRALGIA: Primary | ICD-10-CM

## 2024-10-04 DIAGNOSIS — R14.0 ABDOMINAL BLOATING: ICD-10-CM

## 2024-10-04 LAB
CRP SERPL-MCNC: <3 MG/L
ERYTHROCYTE [SEDIMENTATION RATE] IN BLOOD BY WESTERGREN METHOD: 4 MM/HR (ref 0–20)

## 2024-10-04 PROCEDURE — 86431 RHEUMATOID FACTOR QUANT: CPT | Performed by: FAMILY MEDICINE

## 2024-10-04 PROCEDURE — 99214 OFFICE O/P EST MOD 30 MIN: CPT | Mod: 25 | Performed by: FAMILY MEDICINE

## 2024-10-04 PROCEDURE — 90656 IIV3 VACC NO PRSV 0.5 ML IM: CPT | Performed by: FAMILY MEDICINE

## 2024-10-04 PROCEDURE — 86140 C-REACTIVE PROTEIN: CPT | Performed by: FAMILY MEDICINE

## 2024-10-04 PROCEDURE — 86200 CCP ANTIBODY: CPT | Performed by: FAMILY MEDICINE

## 2024-10-04 PROCEDURE — 90471 IMMUNIZATION ADMIN: CPT | Performed by: FAMILY MEDICINE

## 2024-10-04 PROCEDURE — 90472 IMMUNIZATION ADMIN EACH ADD: CPT | Performed by: FAMILY MEDICINE

## 2024-10-04 PROCEDURE — 86038 ANTINUCLEAR ANTIBODIES: CPT | Performed by: FAMILY MEDICINE

## 2024-10-04 PROCEDURE — 85652 RBC SED RATE AUTOMATED: CPT | Performed by: FAMILY MEDICINE

## 2024-10-04 PROCEDURE — 90715 TDAP VACCINE 7 YRS/> IM: CPT | Performed by: FAMILY MEDICINE

## 2024-10-04 PROCEDURE — 36415 COLL VENOUS BLD VENIPUNCTURE: CPT | Performed by: FAMILY MEDICINE

## 2024-10-04 RX ORDER — MULTIVITAMIN
TABLET ORAL DAILY
COMMUNITY

## 2024-10-04 RX ORDER — LISDEXAMFETAMINE DIMESYLATE 40 MG/1
CAPSULE ORAL
COMMUNITY
Start: 2024-08-21 | End: 2024-11-06

## 2024-10-04 RX ORDER — MULTIVITAMIN WITH IRON
1 TABLET ORAL DAILY
COMMUNITY

## 2024-10-04 ASSESSMENT — ASTHMA QUESTIONNAIRES
ACT_TOTALSCORE: 19
QUESTION_3 LAST FOUR WEEKS HOW OFTEN DID YOUR ASTHMA SYMPTOMS (WHEEZING, COUGHING, SHORTNESS OF BREATH, CHEST TIGHTNESS OR PAIN) WAKE YOU UP AT NIGHT OR EARLIER THAN USUAL IN THE MORNING: NOT AT ALL
QUESTION_2 LAST FOUR WEEKS HOW OFTEN HAVE YOU HAD SHORTNESS OF BREATH: THREE TO SIX TIMES A WEEK
QUESTION_1 LAST FOUR WEEKS HOW MUCH OF THE TIME DID YOUR ASTHMA KEEP YOU FROM GETTING AS MUCH DONE AT WORK, SCHOOL OR AT HOME: A LITTLE OF THE TIME
QUESTION_4 LAST FOUR WEEKS HOW OFTEN HAVE YOU USED YOUR RESCUE INHALER OR NEBULIZER MEDICATION (SUCH AS ALBUTEROL): ONCE A WEEK OR LESS
QUESTION_5 LAST FOUR WEEKS HOW WOULD YOU RATE YOUR ASTHMA CONTROL: SOMEWHAT CONTROLLED
ACT_TOTALSCORE: 19

## 2024-10-04 ASSESSMENT — PATIENT HEALTH QUESTIONNAIRE - PHQ9
10. IF YOU CHECKED OFF ANY PROBLEMS, HOW DIFFICULT HAVE THESE PROBLEMS MADE IT FOR YOU TO DO YOUR WORK, TAKE CARE OF THINGS AT HOME, OR GET ALONG WITH OTHER PEOPLE: SOMEWHAT DIFFICULT
SUM OF ALL RESPONSES TO PHQ QUESTIONS 1-9: 6
SUM OF ALL RESPONSES TO PHQ QUESTIONS 1-9: 6

## 2024-10-04 NOTE — PROGRESS NOTES
A?P:      ICD-10-CM    1. Polyarthralgia  M25.50 Adult Rheumatology  Referral     ESR: Erythrocyte sedimentation rate     CRP, inflammation     Anti Nuclear Kenna IgG by IFA with Reflex     Cyclic Citrullinated Peptide Antibody IgG     Rheumatoid factor     ESR: Erythrocyte sedimentation rate     CRP, inflammation     Anti Nuclear Kenna IgG by IFA with Reflex     Cyclic Citrullinated Peptide Antibody IgG     Rheumatoid factor      2. Other chronic back pain  M54.89     G89.29       3. Abdominal bloating  R14.0 Adult GI  Referral - Consult Only      4. Irregular bowel habits  R19.8 Adult GI  Referral - Consult Only        Polyarthralgia:  reviewed past testing. Offered labs as ordered above.  Reviewed that it does take some time to get in with rheumatology, offered consideration of groups outside [place] that might have improved access.      Back pain:  reviewed that this seems to mostly likely be muscular/musculoskeletal in nature, especially given previous negative imaging per pt report.  Offered PT.  Pt declines, she plans on getting back to exercise on her own which in the past has improved her pain.  Will reach out for referral if she changes her mind      GI concerns:  previous eval through [place], referred back to complete recommended evaluation and determine next steps in management.    Juan Peterson is a 19 year old, presenting for the following health issues:  Musculoskeletal Problem and Gastrointestinal Problem        10/4/2024    11:13 AM   Additional Questions   Roomed by Heather PINEDO   Accompanied by Self      History of Present Illness       Reason for visit:  Referral for rheumatologist and immunologist  Symptom onset:  More than a month  Symptoms include:  Conic, joint chain, fatigue, G.I. problems,  Symptom intensity:  Severe  Symptom progression:  Worsening  Had these symptoms before:  Yes  Has tried/received treatment for these symptoms:  Yes  Previous treatment was  "successful:  No  What makes it worse:  Many daily activities, Food, exercise,  What makes it better:  Massage, acupuncture, pain medication She is missing 4 dose(s) of medications per week.  She is not taking prescribed medications regularly due to side effects and remembering to take.       Has been dealing with back and knee pain since age 12 or 13.  At times her back pain was so severe she would just lay on her back and cry.      Feels like back pain continues and is an issue every day.  Was treating for a period of time with chiropractor and accupuncture,  was helpful but stopped due to the cost.    Pain involves entire back. Feels like it is burning and aching.      Knee pain is an issue a few times per week.    Now over the last 2 years has had aching pain throughout her body and feels like her hips\"lock up\" when doing yoga and she cannot move    When sitting cross legged will find her lower legs fall asleep quickly.    Has shoulder surgery last year and has been dealing with neck and shouler pain following this procedure.    Now more recently fingers feel aching when typing.    Was seen at [facility] for hips recently, states nothing was found.  Had x-ray and MRI knee which did not show any concerns.  Has done PT for these issues without improvement.      Also has stomach problems that are long standing and intermittent.  These are the same symptoms she was seen by [facility] for 2-3 years ago.  Was recommended to have colonoscopy and EGD but did not pursue as she was moving.  Gets \"bloated and gassy and uncomfortable\"      Feels like \"all of her pain makes her very fatigued\"  finds doing things that are active like climbing stairs and shopping are \"exhausting\"    Had been seen by Isis Blas NP, did a lot of hormone testing.  Was treated with a progesterone cream and oral iron.      Was given Vyvanse by her psychiatrist which she took last week.  Felt itchy all over and like she was \"not herself\"  was seen in the ED " "and stopped the Vyvanse. Sees Daisy Piter online only in [place]    Saw derm for hair loss and itchy scalp.  Was not able to figure out what was causing that.    Would like to see rheumatology for a diagnosis for her pain, concerned there is an inflammatory condition causing symptoms.  Requested an immunology referral as she gets colds frequently          Objective    /67   Pulse 81   Temp 98.7  F (37.1  C) (Tympanic)   Resp 16   Ht 1.67 m (5' 5.75\")   Wt 57 kg (125 lb 9.6 oz)   LMP 09/25/2024 (Exact Date)   SpO2 100%   BMI 20.43 kg/m    Body mass index is 20.43 kg/m .  Physical Exam   GENERAL: alert and no distress  RESP: lungs clear to auscultation - no rales, rhonchi or wheezes  CV: regular rate and rhythm, normal S1 S2, no S3 or S4, no murmur, click or rub, no peripheral edema  ABDOMEN: soft, nontender, no hepatosplenomegaly, no masses and bowel sounds normal  MS: no gross musculoskeletal defects noted, no edema.  Spine non tender mildline but tender points noted in traps and along the paraspinal muscles and glutes catrachito  NEURO: Normal strength and tone, mentation intact and speech normal  PSYCH: mentation appears normal, affect normal/bright    EPic reviewed        Signed Electronically by: Dayna Coy DO    "

## 2024-10-04 NOTE — PATIENT INSTRUCTIONS
We'll start with some labs here today.  I will comment on results once they are all in.  As we discussed some of these labs are very nonspecific so borderline positive results might not be significant    I did place a rheumatology referral for you.  They do schedule out quite a ways.  You can also check to see if you have coverage for other groups like [facility] and call for earlier appointments.      I would also recommend getting back in to  gastroenterology.  Please call over to [facility] to schedule your follow up.

## 2024-10-05 LAB — RHEUMATOID FACT SERPL-ACNC: <10 IU/ML

## 2024-10-07 LAB
ANA SER QL IF: NEGATIVE
CCP AB SER IA-ACNC: <0.4 U/ML

## 2024-10-14 ENCOUNTER — MYC MEDICAL ADVICE (OUTPATIENT)
Dept: OBGYN | Facility: CLINIC | Age: 19
End: 2024-10-14
Payer: COMMERCIAL

## 2024-10-14 DIAGNOSIS — B37.31 YEAST INFECTION OF THE VAGINA: ICD-10-CM

## 2024-10-14 DIAGNOSIS — N76.0 BACTERIAL VAGINOSIS: Primary | ICD-10-CM

## 2024-10-14 DIAGNOSIS — B96.89 BACTERIAL VAGINOSIS: Primary | ICD-10-CM

## 2024-10-30 ENCOUNTER — TRANSFERRED RECORDS (OUTPATIENT)
Dept: HEALTH INFORMATION MANAGEMENT | Facility: CLINIC | Age: 19
End: 2024-10-30
Payer: COMMERCIAL

## 2024-11-06 ENCOUNTER — OFFICE VISIT (OUTPATIENT)
Dept: UROLOGY | Facility: CLINIC | Age: 19
End: 2024-11-06
Attending: UROLOGY
Payer: COMMERCIAL

## 2024-11-06 VITALS
HEART RATE: 75 BPM | WEIGHT: 129 LBS | DIASTOLIC BLOOD PRESSURE: 80 MMHG | BODY MASS INDEX: 20.98 KG/M2 | SYSTOLIC BLOOD PRESSURE: 123 MMHG

## 2024-11-06 DIAGNOSIS — N39.3 FEMALE STRESS INCONTINENCE: ICD-10-CM

## 2024-11-06 DIAGNOSIS — Z87.440 PERSONAL HISTORY OF URINARY TRACT INFECTION: Primary | ICD-10-CM

## 2024-11-06 DIAGNOSIS — N89.8 VAGINAL ITCHING: ICD-10-CM

## 2024-11-06 DIAGNOSIS — N89.8 VAGINAL DISCHARGE: ICD-10-CM

## 2024-11-06 DIAGNOSIS — N94.10 DYSPAREUNIA IN FEMALE: ICD-10-CM

## 2024-11-06 LAB
ALBUMIN UR-MCNC: ABNORMAL MG/DL
APPEARANCE UR: ABNORMAL
BACTERIAL VAGINOSIS VAG-IMP: POSITIVE
BILIRUB UR QL STRIP: ABNORMAL
CANDIDA DNA VAG QL NAA+PROBE: DETECTED
CANDIDA GLABRATA / CANDIDA KRUSEI DNA: NOT DETECTED
COLOR UR AUTO: YELLOW
GLUCOSE UR STRIP-MCNC: NEGATIVE MG/DL
HGB UR QL STRIP: NEGATIVE
KETONES UR STRIP-MCNC: NEGATIVE MG/DL
LEUKOCYTE ESTERASE UR QL STRIP: ABNORMAL
NITRATE UR QL: NEGATIVE
PH UR STRIP: 5.5 [PH] (ref 5–8)
SP GR UR STRIP: >=1.03 (ref 1–1.03)
T VAGINALIS DNA VAG QL NAA+PROBE: NOT DETECTED
UROBILINOGEN UR STRIP-ACNC: 0.2 E.U./DL

## 2024-11-06 PROCEDURE — 0352U MULTIPLEX VAGINAL PANEL BY PCR: CPT | Performed by: UROLOGY

## 2024-11-06 PROCEDURE — 81003 URINALYSIS AUTO W/O SCOPE: CPT | Performed by: UROLOGY

## 2024-11-06 PROCEDURE — G0463 HOSPITAL OUTPT CLINIC VISIT: HCPCS | Performed by: UROLOGY

## 2024-11-06 RX ORDER — DEXTROAMPHETAMINE SACCHARATE, AMPHETAMINE ASPARTATE MONOHYDRATE, DEXTROAMPHETAMINE SULFATE AND AMPHETAMINE SULFATE 3.75; 3.75; 3.75; 3.75 MG/1; MG/1; MG/1; MG/1
15 CAPSULE, EXTENDED RELEASE ORAL EVERY MORNING
COMMUNITY
Start: 2024-10-07

## 2024-11-06 ASSESSMENT — PAIN SCALES - GENERAL: PAINLEVEL_OUTOF10: NO PAIN (0)

## 2024-11-06 NOTE — PROGRESS NOTES
November 6, 2024    Referring Provider: Referred Self, MD  No address on file    Primary Care Provider: Dayna Coy    Assessment & Plan     Personal history of urinary tract infection  Need to monitor at this time so recommend that she contact the clinic when she thinks she has a UTI    Discussed avoiding wipe    Discussed supplements that can be considered in UTI prevention     Given the long history of UTIs will plan on a renal US to assess for anatomic abnormalities  - Physical Therapy  Referral; Future  - US Renal Complete Non-Vascular; Future    Female stress incontinence/Dyspareunia in female  Given this with her other symptoms discussed how pelvic floor physical therapy can help this and she is agreeable  - Physical Therapy  Referral; Future    - Physical Therapy  Referral; Future    Vaginal itching/Vaginal discharge  Swab sent today  - Multiplex Vaginal Panel by PCR    Return in about 3 months (around 2/6/2025).    20 minutes were spent on this day of the encounter in reviewing the EMR , direct patient care including ordering renal US and vaginal panel, coordination of care and documentation    Gretchen Greenwood MD MPH  (she/her/hers)   of Urology  [facility]      HPI:  Sandra Moraes is a 19 year old female who presents for evaluation of her pelvic floor symptoms.  She is here with her mother today    Here for UTIs, started with sexual activity.    Uses wipes, showers after intercourse    Dysuria, low back and suprapubic pain.   Only one time has had gross hematuria.    Denies hospitalizations, fevers with UTIs    Stomach issues with bloating, food sensitivities, constipation/diarrhea since a child.  Re-establishing with [facility].    Using d-mannose, cranberry.      Past Medical History:   Diagnosis Date    ADHD (attention deficit hyperactivity disorder)     Depressive disorder 2019    Uncomplicated asthma 2005     Past Surgical History:   Procedure  Laterality Date    ENT SURGERY  11/2021    tonsillectomy    SHOULDER SURGERY Left 09/2023     Social History     Socioeconomic History    Marital status: Single     Spouse name: Not on file    Number of children: Not on file    Years of education: Not on file    Highest education level: Not on file   Occupational History    Not on file   Tobacco Use    Smoking status: Never     Passive exposure: Yes    Smokeless tobacco: Never    Tobacco comments:     i use vapes passivley - if friends have them - trying to quit at the moment   Vaping Use    Vaping status: Former    Substances: Nicotine, Flavoring    Devices: Disposable, Pre-filled or refillable cartridge, Refillable tank, Pre-filled pod   Substance and Sexual Activity    Alcohol use: Yes     Comment: 2-4 days a week    Drug use: Not Currently     Types: Marijuana     Comment: 1-3x a week    Sexual activity: Yes     Partners: Male     Birth control/protection: Pill   Other Topics Concern    Parent/sibling w/ CABG, MI or angioplasty before 65F 55M? No   Social History Narrative    Not on file     Social Drivers of Health     Financial Resource Strain: Not on file   Food Insecurity: Low Risk  (2/11/2024)    Food Insecurity     Within the past 12 months, did you worry that your food would run out before you got money to buy more?: No     Within the past 12 months, did the food you bought just not last and you didn t have money to get more?: No   Transportation Needs: Low Risk  (2/11/2024)    Transportation Needs     Within the past 12 months, has lack of transportation kept you from medical appointments, getting your medicines, non-medical meetings or appointments, work, or from getting things that you need?: No   Physical Activity: Sufficiently Active (2/11/2024)    Exercise Vital Sign     Days of Exercise per Week: 4 days     Minutes of Exercise per Session: 90 min   Stress: Not on file   Social Connections: Unknown (2/21/2024)    Received from [facility]     Social Connections     In the past 3 months, do you feel that you lack companionship or social support?: Not on file   Interpersonal Safety: Low Risk  (10/4/2024)    Interpersonal Safety     Do you feel physically and emotionally safe where you currently live?: Yes     Within the past 12 months, have you been hit, slapped, kicked or otherwise physically hurt by someone?: No     Within the past 12 months, have you been humiliated or emotionally abused in other ways by your partner or ex-partner?: No   Housing Stability: High Risk (2/11/2024)    Housing Stability     Do you have housing? : Yes     Are you worried about losing your housing?: Yes       Family History   Problem Relation Age of Onset    Thyroid Disease Mother     Depression Father     Anxiety Disorder Father     Mental Illness Father      ROS    No Known Allergies    Current Outpatient Medications   Medication Sig Dispense Refill    albuterol (PROAIR HFA/PROVENTIL HFA/VENTOLIN HFA) 108 (90 Base) MCG/ACT inhaler Inhale 2 puffs into the lungs every 6 hours as needed for shortness of breath, wheezing or cough 18 g 0    amphetamine-dextroamphetamine (ADDERALL XR) 15 MG 24 hr capsule Take 15 mg by mouth every morning.      Bioflavonoid Products (VITAMIN C) CHEW Take by mouth daily.      COLLAGEN PO Take by mouth daily.      CRANBERRY PO Take by mouth daily.      D-MANNOSE PO Take by mouth daily.      Ferrous Sulfate (IRON) 28 MG TABS Take by mouth daily      fluticasone-salmeterol (ADVAIR-HFA) 230-21 MCG/ACT inhaler Inhale 2 puffs into the lungs 2 times daily 12 g 1    magnesium 250 MG tablet Take 1 tablet by mouth daily.      Multiple vitamin TABS Take by mouth daily.      norgestimate-ethinyl estradiol (ORTHO-CYCLEN) 0.25-35 MG-MCG tablet Take 1 tablet by mouth daily 90 tablet 3    Vitamin D3 (CHOLECALCIFEROL) 125 MCG (5000 UT) tablet Take 1 tablet by mouth daily.       No current facility-administered medications for this visit.       /80   Pulse 75    Wt 58.5 kg (129 lb)   LMP 10/24/2024 (Exact Date)   BMI 20.98 kg/m    GENERAL: healthy, alert and no distress  EYES: Eyes grossly normal to inspection, conjunctivae and sclerae normal  HENT: normal cephalic/atraumatic.  External ears, nose and mouth without ulcers or lesions.  RESP: no audible wheeze, cough, or visible cyanosis.  No visible retractions or increased work of breathing.  Able to speak fully in complete sentences.  NEURO: Cranial nerves grossly intact, mentation intact and speech normal  PSYCH: mentation appears normal, affect normal/bright, judgement and insight intact, normal speech and appearance well-groomed    CC  Patient Care Team:  Dayna Coy DO as PCP - General (Family Medicine)  Sydni Figueredo MD as MD (OB/Gyn)  Wendy Wallace MD as Physician (Infectious Diseases)  Adriana Ibanez APRN CNP as Assigned OBGYN Provider  Alba Grossman MBBS as MD (Rheumatology)  Dayna Coy DO as Assigned PCP  SELF, REFERRED

## 2024-11-06 NOTE — NURSING NOTE
Consult uit's    used to be after intercourse-- uirnates,   showers  takes dmanos  and cranberry and still gets them  thinks she has  BV or yeast right now    Went to [facility]  wants to test for celciac   colonscopy  and endoscopy coming up  and also seeing rhematologist

## 2024-11-06 NOTE — LETTER
11/6/2024       RE: Sandra Moraes  [address] [place] [facility] [address]     Dear Colleague,    Thank you for referring your patient, Sandra Moraes, to the [facility] [place] [facility]. Please see a copy of my visit note below.    November 6, 2024    Referring Provider: Referred Self, MD  No address on file    Primary Care Provider: Dayna Coy    Assessment & Plan    Personal history of urinary tract infection  Need to monitor at this time so recommend that she contact the clinic when she thinks she has a UTI    Discussed avoiding wipe    Discussed supplements that can be considered in UTI prevention     Given the long history of UTIs will plan on a renal US to assess for anatomic abnormalities  - Physical Therapy  Referral; Future  - US Renal Complete Non-Vascular; Future    Female stress incontinence/Dyspareunia in female  Given this with her other symptoms discussed how pelvic floor physical therapy can help this and she is agreeable  - Physical Therapy  Referral; Future    - Physical Therapy  Referral; Future    Vaginal itching/Vaginal discharge  Swab sent today  - Multiplex Vaginal Panel by PCR    Return in about 3 months (around 2/6/2025).    20 minutes were spent on this day of the encounter in reviewing the EMR , direct patient care including ordering renal US and vaginal panel, coordination of care and documentation    Gretchen Greenwood MD MPH  (she/her/hers)   of Urology  [facility]      HPI:  Sandra Moraes is a 19 year old female who presents for evaluation of her pelvic floor symptoms.  She is here with her mother today    Here for UTIs, started with sexual activity.    Uses wipes, showers after intercourse    Dysuria, low back and suprapubic pain.   Only one time has had gross hematuria.    Denies hospitalizations, fevers with UTIs    Stomach issues with  bloating, food sensitivities, constipation/diarrhea since a child.  Re-establishing with [facility].    Using d-mannose, cranberry.      Past Medical History:   Diagnosis Date     ADHD (attention deficit hyperactivity disorder)      Depressive disorder 2019     Uncomplicated asthma 2005     Past Surgical History:   Procedure Laterality Date     ENT SURGERY  11/2021    tonsillectomy     SHOULDER SURGERY Left 09/2023     Social History     Socioeconomic History     Marital status: Single     Spouse name: Not on file     Number of children: Not on file     Years of education: Not on file     Highest education level: Not on file   Occupational History     Not on file   Tobacco Use     Smoking status: Never     Passive exposure: Yes     Smokeless tobacco: Never     Tobacco comments:     i use vapes passivley - if friends have them - trying to quit at the moment   Vaping Use     Vaping status: Former     Substances: Nicotine, Flavoring     Devices: Disposable, Pre-filled or refillable cartridge, Refillable tank, Pre-filled pod   Substance and Sexual Activity     Alcohol use: Yes     Comment: 2-4 days a week     Drug use: Not Currently     Types: Marijuana     Comment: 1-3x a week     Sexual activity: Yes     Partners: Male     Birth control/protection: Pill   Other Topics Concern     Parent/sibling w/ CABG, MI or angioplasty before 65F 55M? No   Social History Narrative     Not on file     Social Drivers of Health     Financial Resource Strain: Not on file   Food Insecurity: Low Risk  (2/11/2024)    Food Insecurity      Within the past 12 months, did you worry that your food would run out before you got money to buy more?: No      Within the past 12 months, did the food you bought just not last and you didn t have money to get more?: No   Transportation Needs: Low Risk  (2/11/2024)    Transportation Needs      Within the past 12 months, has lack of transportation kept you from medical appointments, getting your medicines,  non-medical meetings or appointments, work, or from getting things that you need?: No   Physical Activity: Sufficiently Active (2/11/2024)    Exercise Vital Sign      Days of Exercise per Week: 4 days      Minutes of Exercise per Session: 90 min   Stress: Not on file   Social Connections: Unknown (2/21/2024)    Received from [facility]    Social Connections      In the past 3 months, do you feel that you lack companionship or social support?: Not on file   Interpersonal Safety: Low Risk  (10/4/2024)    Interpersonal Safety      Do you feel physically and emotionally safe where you currently live?: Yes      Within the past 12 months, have you been hit, slapped, kicked or otherwise physically hurt by someone?: No      Within the past 12 months, have you been humiliated or emotionally abused in other ways by your partner or ex-partner?: No   Housing Stability: High Risk (2/11/2024)    Housing Stability      Do you have housing? : Yes      Are you worried about losing your housing?: Yes       Family History   Problem Relation Age of Onset     Thyroid Disease Mother      Depression Father      Anxiety Disorder Father      Mental Illness Father      ROS    No Known Allergies    Current Outpatient Medications   Medication Sig Dispense Refill     albuterol (PROAIR HFA/PROVENTIL HFA/VENTOLIN HFA) 108 (90 Base) MCG/ACT inhaler Inhale 2 puffs into the lungs every 6 hours as needed for shortness of breath, wheezing or cough 18 g 0     amphetamine-dextroamphetamine (ADDERALL XR) 15 MG 24 hr capsule Take 15 mg by mouth every morning.       Bioflavonoid Products (VITAMIN C) CHEW Take by mouth daily.       COLLAGEN PO Take by mouth daily.       CRANBERRY PO Take by mouth daily.       D-MANNOSE PO Take by mouth daily.       Ferrous Sulfate (IRON) 28 MG TABS Take by mouth daily       fluticasone-salmeterol (ADVAIR-HFA) 230-21 MCG/ACT inhaler Inhale 2 puffs into the lungs 2 times daily 12 g 1     magnesium 250 MG tablet Take 1  tablet by mouth daily.       Multiple vitamin TABS Take by mouth daily.       norgestimate-ethinyl estradiol (ORTHO-CYCLEN) 0.25-35 MG-MCG tablet Take 1 tablet by mouth daily 90 tablet 3     Vitamin D3 (CHOLECALCIFEROL) 125 MCG (5000 UT) tablet Take 1 tablet by mouth daily.       No current facility-administered medications for this visit.       /80   Pulse 75   Wt 58.5 kg (129 lb)   LMP 10/24/2024 (Exact Date)   BMI 20.98 kg/m    GENERAL: healthy, alert and no distress  EYES: Eyes grossly normal to inspection, conjunctivae and sclerae normal  HENT: normal cephalic/atraumatic.  External ears, nose and mouth without ulcers or lesions.  RESP: no audible wheeze, cough, or visible cyanosis.  No visible retractions or increased work of breathing.  Able to speak fully in complete sentences.  NEURO: Cranial nerves grossly intact, mentation intact and speech normal  PSYCH: mentation appears normal, affect normal/bright, judgement and insight intact, normal speech and appearance well-groomed    CC  Patient Care Team:  Dayna Coy DO as PCP - General (Family Medicine)  Sydni Figueredo MD as MD (OB/Gyn)  Wendy Wallace MD as Physician (Infectious Diseases)  Adriana Ibanez APRN CNP as Assigned OBGYN Provider  Alba Grossman MBBS as MD (Rheumatology)  Dayna Coy DO as Assigned PCP  SELF, REFERRED                Again, thank you for allowing me to participate in the care of your patient.      Sincerely,    Gretchen Greenwood MD

## 2024-11-06 NOTE — PATIENT INSTRUCTIONS
Avoid baby wipes and wet wipes.    Websites with free information:    [employer] patient website: www.voicesforpfd.org    Total Control Program: www.totalcontrolprogram.com    Supplements to prevent UTI to consider  -Probiotics  -Cranberry (for these products let them know a doctor is recommending them)   Saba: https://TravelRent.com/   Theracran HP by [employer] PRC 54013  -d-mannose 2gm daily  -Vitamin C 500-1000mg twice a day    If you think you have a [phone] or Hansa OSORIO [phone]    Please see one of the dedicated pelvic floor physical therapist. If you have not heard from the scheduling office within 2 business days, please call [phone] for [facility]    Return in 3 months, sooner if needed    It was a pleasure meeting with you today.  Thank you for allowing me and my team the privilege of caring for you today.  YOU are the reason we are here, and I truly hope we provided you with the excellent service you deserve.  Please let us know if there is anything else we can do for you so that we can be sure you are leaving completely satisfied with your care experience.    [facility]  [address] [address]  [facility]  [phone]    [facility] [place]  [address] 5th floor  [facility]   [phone]  Building attached to hospital parking ramp

## 2024-11-07 ENCOUNTER — OFFICE VISIT (OUTPATIENT)
Dept: RHEUMATOLOGY | Facility: CLINIC | Age: 19
End: 2024-11-07
Payer: COMMERCIAL

## 2024-11-07 ENCOUNTER — HOSPITAL ENCOUNTER (OUTPATIENT)
Dept: GENERAL RADIOLOGY | Facility: HOSPITAL | Age: 19
Discharge: HOME | End: 2024-11-07
Attending: INTERNAL MEDICINE | Admitting: INTERNAL MEDICINE
Payer: COMMERCIAL

## 2024-11-07 ENCOUNTER — LAB (OUTPATIENT)
Dept: LAB | Facility: CLINIC | Age: 19
End: 2024-11-07
Payer: COMMERCIAL

## 2024-11-07 VITALS
SYSTOLIC BLOOD PRESSURE: 113 MMHG | BODY MASS INDEX: 20.95 KG/M2 | HEART RATE: 80 BPM | OXYGEN SATURATION: 99 % | DIASTOLIC BLOOD PRESSURE: 78 MMHG | WEIGHT: 128.8 LBS

## 2024-11-07 DIAGNOSIS — M25.50 POLYARTHRALGIA: ICD-10-CM

## 2024-11-07 DIAGNOSIS — G89.29 CHRONIC BILATERAL LOW BACK PAIN WITHOUT SCIATICA: ICD-10-CM

## 2024-11-07 DIAGNOSIS — M54.2 CERVICALGIA: ICD-10-CM

## 2024-11-07 DIAGNOSIS — G89.29 CHRONIC BILATERAL LOW BACK PAIN WITHOUT SCIATICA: Primary | ICD-10-CM

## 2024-11-07 DIAGNOSIS — M54.50 CHRONIC BILATERAL LOW BACK PAIN WITHOUT SCIATICA: ICD-10-CM

## 2024-11-07 DIAGNOSIS — M54.50 CHRONIC BILATERAL LOW BACK PAIN WITHOUT SCIATICA: Primary | ICD-10-CM

## 2024-11-07 LAB
FERRITIN SERPL-MCNC: 65 NG/ML (ref 6–175)
HCV AB SERPL QL IA: NONREACTIVE

## 2024-11-07 PROCEDURE — 99204 OFFICE O/P NEW MOD 45 MIN: CPT | Performed by: INTERNAL MEDICINE

## 2024-11-07 PROCEDURE — 82728 ASSAY OF FERRITIN: CPT

## 2024-11-07 PROCEDURE — 86803 HEPATITIS C AB TEST: CPT

## 2024-11-07 PROCEDURE — 72100 X-RAY EXAM L-S SPINE 2/3 VWS: CPT

## 2024-11-07 PROCEDURE — 72200 X-RAY EXAM SI JOINTS: CPT

## 2024-11-07 PROCEDURE — G2211 COMPLEX E/M VISIT ADD ON: HCPCS | Performed by: INTERNAL MEDICINE

## 2024-11-07 PROCEDURE — 72050 X-RAY EXAM NECK SPINE 4/5VWS: CPT

## 2024-11-07 PROCEDURE — 36415 COLL VENOUS BLD VENIPUNCTURE: CPT

## 2024-11-07 NOTE — PROGRESS NOTES
This document was created using a software with less than 100% fidelity, at times resulting in unintended, even erroneous syntax and grammar.  The reader is advised to keep this under consideration while reviewing, interpreting this note.      Rheumatology Consult Note      Sandra Moraes     YOB: 2005 Age: 19 year old    Date of visit: 11/07/24    PCP: Dayna Coy    Chief Complaint   Patient presents with:  Consult      Assessment and Plan     Kristen was seen today for consult.    Diagnoses and all orders for this visit:    Chronic bilateral low back pain without sciatica  -     XR Sacroiliac Joint 1/2 Views; Future  -     XR Lumbar Spine 2/3 Views; Future    Polyarthralgia  -     Adult Rheumatology  Referral  -     XR Cervical Spine G/E 4 Views; Future  -     XR Sacroiliac Joint 1/2 Views; Future  -     XR Lumbar Spine 2/3 Views; Future  -     Hepatitis C antibody; Future  -     Ferritin; Future    Cervicalgia  -     XR Cervical Spine G/E 4 Views; Future      This patient with at least 10-year long history of widespread achiness, pain, particularly in her neck lower back, knee ankle and small joints of the hands has no clinical features to suggest an inflammatory arthropathy or definite signals of seronegative spondyloarthropathy.  While this is reassuring this does not explain why she has been experiencing the symptoms.  There are some signals to suggest that these symptoms may be noninflammatory given some of the sensory features discussed in the main dictation.  She does have a known restorative sleep.  She was recently started on Adderall by her psychiatrist I will ask her to check if they can give her duloxetine in addition or instead.  I have outlined to her that anyone at her age complaining of low back pain has to have clarity of if there is evidence of spondylitis.  X-rays will be taken as noted.  Her workup including rheumatoid factor anti-CCP antibody DIANA rheumatoid factor  "and C-reactive protein have been normal.  We briefly discussed fibromyalgia given literature to review.     Return to clinic: 2 months      HPI   Sandra Moraes is a 19 year old female  is seen today for evaluation of polyarthralgias neck and back pain.  She is accompanied by her mother.  The symptoms go back 10+ years.  Even as a child they both recall that she would hurt in her neck and back.  She has been going to the chiropractors for several years.  She has had over the time increasing pain in various areas and as we went through most of those joint regions it would appear that hardly any joint has been spared from where she has not ambulated pain.  The worst of the pain is in her neck and lower back.  She is also had pain in her knees ankles and hands.  She reports hip pain.  She has noted these pains trouble her on daily basis there are some days when it is so hard for her to get up and get out of the bed.  She has not observed swelling in any of her joints however.  There is been no history of psoriasis ulcerative colitis or Crohn's disease herself or the family.  She has longstanding GI symptoms for which she has been seen at [facility] further workup is underway including endoscopy.  She also gets \"frequent UTIs even before she has an abnormal urinalysis she would get the symptoms.  She has followed up in urology.  She recalls having had injury to the right shoulder where she had a labral tear and repair.  She has been to orthopedics.  She has had MRI and other imaging of the knees without abnormality detected.  She reports no mouth ulcers, photosensitivity indeed she would like to be in cooler or darker place.  She finds that her undergarments clothes sometimes feel too uncomfortable.  She wakes up in the morning typically on refreshed.  She has had some of the biggest improvements in her symptoms with acupuncture.  She takes acetaminophen, ibuprofen over-the-counter.  She feels that it " "provides her with \"15\" percent improvement if that.  She reports no pleuritic symptoms she has never been pregnant.  She describes no features suggestive of uveitis/iritis.  No features suggestive of erythema nodosum..  She works as a .  End of the day her pains get worse.  In the morning she is stiff all over that can be as long as 3 to 4 hours.  As a child she was quite flexible less so now.  She is not a smoker.         Active Problem List     Patient Active Problem List   Diagnosis    Abdominal bloating    Abdominal distension, gaseous    Attention deficit hyperactivity disorder (ADHD), predominantly inattentive type    Back pain    Chest pain    Chronic pain    COVID-19    Digestive symptom    Iron deficiency    Irregular bowel habits    Panic attack    Reduced mobility     Past Medical History     Past Medical History:   Diagnosis Date    ADHD (attention deficit hyperactivity disorder)     Depressive disorder 2019    Uncomplicated asthma 2005     Past Surgical History     Past Surgical History:   Procedure Laterality Date    ENT SURGERY  11/2021    tonsillectomy    SHOULDER SURGERY Left 09/2023     Allergy   No Known Allergies  Current Medication List     Current Outpatient Medications   Medication Sig Dispense Refill    albuterol (PROAIR HFA/PROVENTIL HFA/VENTOLIN HFA) 108 (90 Base) MCG/ACT inhaler Inhale 2 puffs into the lungs every 6 hours as needed for shortness of breath, wheezing or cough 18 g 0    amphetamine-dextroamphetamine (ADDERALL XR) 15 MG 24 hr capsule Take 15 mg by mouth every morning.      Bioflavonoid Products (VITAMIN C) CHEW Take by mouth daily.      COLLAGEN PO Take by mouth daily.      CRANBERRY PO Take by mouth daily.      D-MANNOSE PO Take by mouth daily.      Ferrous Sulfate (IRON) 28 MG TABS Take by mouth daily      fluticasone-salmeterol (ADVAIR-HFA) 230-21 MCG/ACT inhaler Inhale 2 puffs into the lungs 2 times daily 12 g 1    magnesium 250 MG tablet Take 1 tablet by mouth " daily.      Multiple vitamin TABS Take by mouth daily.      norgestimate-ethinyl estradiol (ORTHO-CYCLEN) 0.25-35 MG-MCG tablet Take 1 tablet by mouth daily 90 tablet 3    Vitamin D3 (CHOLECALCIFEROL) 125 MCG (5000 UT) tablet Take 1 tablet by mouth daily.       No current facility-administered medications for this visit.       No current facility-administered medications for this visit.        Family History     Family History   Problem Relation Age of Onset    Thyroid Disease Mother     Depression Father     Anxiety Disorder Father     Mental Illness Father          Physical Exam     COMPREHENSIVE EXAMINATION:  Vitals:    11/07/24 1353   BP: 113/78   BP Location: Right arm   Patient Position: Sitting   Cuff Size: Adult Regular   Pulse: 80   SpO2: 99%   Weight: 58.4 kg (128 lb 12.8 oz)     A well appearing alert oriented female. Vital data as noted above. Her eyes examined for inflammation/scleromalacia. ENT examined for oral mucositis, moisture, thrush, nasal deformity, external ear redness, deformity. Her neck is examined for suppleness and lymphadenopathy.  Cardiopulmonary examination without dyspnea at rest, use of accessory muscles of breathing, wheezing, edema, peripheral or central cyanosis.  Abdomen examined for softness, tenderness, obvious organomegaly.  Skin examined for heliotrope, malar area eruption, lupus pernio, periungual erythema, sclerodactyly, papules, erythema nodosum, purpura, nail pitting, onycholysis, and obvious psoriasis lesion. Neurological examination done for alertness, speech, facial symmetry,  tone and power in upper and lower extremities, and gait. The joint examination is performed for swelling, tenderness, warmth, erythema, and range of motion in the following joints: DIPs, PIPs, MCPs, wrists, first CMC's, elbows, shoulders, hips, knees, ankles, feet; spine for range of motion and paraspinal muscles for tenderness. The salient normal / abnormal findings are appended.  She does not  have synovitis of palpable joints of upper and lower extremities.  She does not have dactylitis of digits or toes.  There is no enthesopathy along the Achilles.  There is no loss of finger pulp..  She does not have triggering of digits or toes.  She does not have sclerodactyly periungual erythema stomatitis.  There is no rash on her face.  She has minimal if any signals of hypermobility.  She has mild tenderness across trapezius along the paraspinal region, trochanteric area.  She had minimal to mild patellar apprehension test positivity.  There is no tenting of the skin at the olecranon area.  She has artificial nails.    Labs / Imaging (select studies)     Rheumatoid Factor   Date Value Ref Range Status   10/04/2024 <10 <14 IU/mL Final   04/19/2019 <20 <20 IU/mL Final      Hemoglobin   Date Value Ref Range Status   09/05/2023 14.4 11.7 - 15.7 g/dL Final   07/12/2022 13.9 11.7 - 15.7 g/dL Final   12/04/2021 12.6 11.7 - 15.7 g/dL Final   04/19/2019 15.4 11.7 - 15.7 g/dL Final     Sed Rate   Date Value Ref Range Status   04/19/2019 4 0 - 15 mm/h Final     Erythrocyte Sedimentation Rate   Date Value Ref Range Status   10/04/2024 4 0 - 20 mm/hr Final     CRP Inflammation   Date Value Ref Range Status   04/19/2019 <2.9 0.0 - 8.0 mg/L Final     AST   Date Value Ref Range Status   08/09/2023 28 0 - 45 U/L Final     Comment:     Reference intervals for this test were updated on 6/12/2023 to more accurately reflect our healthy population. There may be differences in the flagging of prior results with similar values performed with this method. Interpretation of those prior results can be made in the context of the updated reference intervals.     ALT   Date Value Ref Range Status   08/09/2023 21 0 - 50 U/L Final     Comment:     Reference intervals for this test were updated on 6/12/2023 to more accurately reflect our healthy population. There may be differences in the flagging of prior results with similar values performed  with this method. Interpretation of those prior results can be made in the context of the updated reference intervals.       Rheumatoid Factor   Date Value Ref Range Status   10/04/2024 <10 <14 IU/mL Final   04/19/2019 <20 <20 IU/mL Final          Immunization History     Immunization History   Administered Date(s) Administered    COVID-19 Monovalent 18+ (Moderna) 03/24/2021, 04/21/2021    COVID-19 Monovalent Booster 18+ (Moderna) 12/29/2021    DTAP (<7y) 2005, 2005, 01/14/2004, 01/28/2005, 08/29/2008    Flu, Unspecified 11/17/2006    HEPATITIS A (PEDS 12M-18Y) 07/18/2005, 08/17/2007    HIB (PRP-T) 2003, 2003, 01/28/2005    HPV9 08/30/2016, 09/05/2017    Hepatitis B, Peds 2003, 2003, 01/28/2005    Influenza (IIV3) PF 11/14/2008    Influenza Vaccine >6 months,quad, PF 09/30/2020, 12/29/2021    Influenza, Split Virus, Trivalent, Pf (Fluzone\Fluarix) 10/04/2024    Influenza, seasonal, injectable, PF 11/16/2007, 09/13/2010    MMR 07/15/2004    MMR/V 08/17/2007    Meningococcal (Trumenba ) 07/18/2019, 07/31/2020    Meningococcal ACWY (Menveo ) 08/26/2014, 07/18/2019    Pneumococcal (PCV 7) 2005, 2005, 01/14/2004    Poliovirus, inactivated (IPV) 2003, 2003, 01/14/2004, 08/29/2008    TDAP (Adacel,Boostrix) 08/26/2014, 10/04/2024    Varicella 07/15/2004

## 2024-11-11 ENCOUNTER — TELEPHONE (OUTPATIENT)
Dept: OBGYN | Facility: CLINIC | Age: 19
End: 2024-11-11
Payer: COMMERCIAL

## 2024-11-11 RX ORDER — FLUCONAZOLE 150 MG/1
150 TABLET ORAL ONCE
Qty: 1 TABLET | Refills: 0 | Status: SHIPPED | OUTPATIENT
Start: 2024-11-11 | End: 2024-11-11

## 2024-11-11 RX ORDER — METRONIDAZOLE 7.5 MG/G
1 GEL VAGINAL AT BEDTIME
Qty: 70 G | Refills: 0 | Status: SHIPPED | OUTPATIENT
Start: 2024-11-11 | End: 2024-11-16

## 2024-11-11 NOTE — RESULT ENCOUNTER NOTE
LM on  for patient to call back about results and recommendations. Needs Metrogel x 5 nights and Diflucan x 1 ( take Diflucan after antibiotics completed) sent to pharmacy.

## 2024-11-11 NOTE — TELEPHONE ENCOUNTER
Spoke with Kristen. Reviewed lab results and Dr. Greenwood's recommended treatments. Sent treatments to pharmacy on file. Went over how to administer treatments to patient. No questions after phone call completed.

## 2024-11-18 ENCOUNTER — MYC MEDICAL ADVICE (OUTPATIENT)
Dept: FAMILY MEDICINE | Facility: CLINIC | Age: 19
End: 2024-11-18
Payer: COMMERCIAL

## 2024-11-19 ENCOUNTER — HOSPITAL ENCOUNTER (OUTPATIENT)
Dept: ULTRASOUND IMAGING | Facility: CLINIC | Age: 19
Discharge: HOME | End: 2024-11-19
Attending: UROLOGY
Payer: COMMERCIAL

## 2024-11-19 DIAGNOSIS — Z87.440 PERSONAL HISTORY OF URINARY TRACT INFECTION: ICD-10-CM

## 2024-11-19 PROCEDURE — 76770 US EXAM ABDO BACK WALL COMP: CPT

## 2024-12-10 ENCOUNTER — OFFICE VISIT (OUTPATIENT)
Dept: OBGYN | Facility: CLINIC | Age: 19
End: 2024-12-10
Payer: COMMERCIAL

## 2024-12-10 VITALS
HEART RATE: 101 BPM | BODY MASS INDEX: 19.52 KG/M2 | SYSTOLIC BLOOD PRESSURE: 115 MMHG | WEIGHT: 120 LBS | DIASTOLIC BLOOD PRESSURE: 74 MMHG

## 2024-12-10 DIAGNOSIS — Z30.430 ENCOUNTER FOR IUD INSERTION: ICD-10-CM

## 2024-12-10 DIAGNOSIS — R10.2 PELVIC PAIN IN FEMALE: ICD-10-CM

## 2024-12-10 DIAGNOSIS — Z12.4 CERVICAL CANCER SCREENING: Primary | ICD-10-CM

## 2024-12-10 DIAGNOSIS — Z11.3 ROUTINE SCREENING FOR STI (SEXUALLY TRANSMITTED INFECTION): ICD-10-CM

## 2024-12-10 LAB
HBV SURFACE AG SERPL QL IA: NONREACTIVE
HCV AB SERPL QL IA: NONREACTIVE
HIV 1+2 AB+HIV1 P24 AG SERPL QL IA: NONREACTIVE
T PALLIDUM AB SER QL: NONREACTIVE

## 2024-12-10 PROCEDURE — 87340 HEPATITIS B SURFACE AG IA: CPT | Performed by: OBSTETRICS & GYNECOLOGY

## 2024-12-10 PROCEDURE — 36415 COLL VENOUS BLD VENIPUNCTURE: CPT | Performed by: OBSTETRICS & GYNECOLOGY

## 2024-12-10 PROCEDURE — 87389 HIV-1 AG W/HIV-1&-2 AB AG IA: CPT | Performed by: OBSTETRICS & GYNECOLOGY

## 2024-12-10 PROCEDURE — 86780 TREPONEMA PALLIDUM: CPT | Performed by: OBSTETRICS & GYNECOLOGY

## 2024-12-10 PROCEDURE — 86803 HEPATITIS C AB TEST: CPT | Performed by: OBSTETRICS & GYNECOLOGY

## 2024-12-10 RX ORDER — OXYCODONE HYDROCHLORIDE 5 MG/1
5 TABLET ORAL EVERY 6 HOURS PRN
Qty: 3 TABLET | Refills: 0 | Status: SHIPPED | OUTPATIENT
Start: 2024-12-10 | End: 2024-12-13

## 2024-12-10 RX ORDER — LORAZEPAM 0.5 MG/1
0.5 TABLET ORAL ONCE
Qty: 1 TABLET | Refills: 0 | Status: SHIPPED | OUTPATIENT
Start: 2024-12-10 | End: 2024-12-10

## 2024-12-10 NOTE — PROGRESS NOTES
GYN Progress Note     CC: Pelvic pain/dyspareunia and pap smear     HISTORY OF PRESENT ILLNESS:    Sandra Moraes is a 19 year old  who presents for follow up for pelvic pain and dyspareunia. She reports discomfort with intercourse both at the opening of the vagina as well as pain with deeper penetration. She has also had more painful periods over the past 1-2 years with worsening cramping, back and hip pain. She found out that her grandmother had endometriosis and is concerned that this could be the case for her too. She did try OCPs for about 4 months but thought it had a negative impact on her mood so discontinued them about a month ago, dysmenorrhea not significantly improved with OCPs. She is not currently sexually active. She would like STI screening today but denies any known exposures or concerns.         OB HISTORY:  OB History    Para Term  AB Living   0 0 0 0 0 0   SAB IAB Ectopic Multiple Live Births   0 0 0 0 0            GYN HISTORY:  She has never had a pap smear, HPV vaccine series completed        PAST MEDICAL HISTORY:  Past Medical History:   Diagnosis Date    ADHD (attention deficit hyperactivity disorder)     Depressive disorder 2019    Uncomplicated asthma 2005            PAST SURGICAL HISTORY:  Past Surgical History:   Procedure Laterality Date    ENT SURGERY  2021    tonsillectomy    SHOULDER SURGERY Left 2023          CURRENT MEDICATIONS:   Current Outpatient Medications   Medication Sig Dispense Refill    amphetamine-dextroamphetamine (ADDERALL XR) 15 MG 24 hr capsule Take 15 mg by mouth every morning.      norgestimate-ethinyl estradiol (ORTHO-CYCLEN) 0.25-35 MG-MCG tablet Take 1 tablet by mouth daily 90 tablet 3            ALLERGIES:  Patient has no known allergies.         SOCIAL HISTORY:  Social History     Tobacco Use    Smoking status: Never     Passive exposure: Yes    Smokeless tobacco: Never    Tobacco comments:     i use vapes passivley - if friends have  them - trying to quit at the moment   Vaping Use    Vaping status: Former    Substances: Nicotine, Flavoring    Devices: Disposable, Pre-filled or refillable cartridge, Refillable tank, Pre-filled pod   Substance Use Topics    Alcohol use: Yes     Comment: 2-4 days a week    Drug use: Not Currently     Types: Marijuana     Comment: 1-3x a week            FAMILY HISTORY:  Family History   Problem Relation Age of Onset    Thyroid Disease Mother     Depression Father     Anxiety Disorder Father     Mental Illness Father             REVIEW OF SYSTEMS:  See HPI      PHYSICAL EXAMINATION:  VS:/74 (BP Location: Left arm, Patient Position: Sitting, Cuff Size: Adult Regular)   Pulse 101   Wt 54.4 kg (120 lb)   LMP 11/15/2024 (Exact Date)   BMI 19.52 kg/m    Body mass index is 19.52 kg/m .    General: Patient alert and oriented, no acute distress  CV: no peripheral edema or cyanosis  Resp: normal respiratory effort and equal lung expansion  Abdomen: non-tender to light and deep palpation, no masses, organomegaly or hernia  : normal external genitalia without lesions. Urethral meatus normal, urethra and bladder non-tender to palpation. Vaginal mucosa normal in appearance without lesions, scant physiologic discharge. Cervix appears grossly normal.  Uterus normal size and contour, non-tender. No adnexal fullness or masses present.  Ext: non-tender, no edema      Laboratory values:  Imaging findings:        ASSESSMENT:  Sandra Moraes is a 19 year old  who presents for evaluation of the following concerns:       PLAN:  (Z12.4) Cervical cancer screening  (primary encounter diagnosis)  -HPV vaccines completed   Plan: Pap screen only - recommended age 21 - 24 years      (Z11.3) Routine screening for STI (sexually transmitted infection)  Plan: Hepatitis B surface antigen, Hepatitis C         antibody, HIV Antigen Antibody Combo Cascade,         Treponema Abs w Reflex to RPR and Titer,         NEISSERIA GONORRHOEA  PCR, CHLAMYDIA TRACHOMATIS        PCR         (Z30.430) Encounter for IUD insertion  -We reviewed risks of IUD insertion as well as pain management options, she will take 600 mg of Ibuprofen an hour prior to the appointment. She will also take 5 mg of Oxycodone and 0.5 mg of Ativan once she arrives at clinic, she is aware that she will need a  to and from clinic that day.   -We will also plan for a cervical block    Plan: oxyCODONE (ROXICODONE) 5 MG tablet, LORazepam         (ATIVAN) 0.5 MG tablet            (R10.2) Pelvic pain in female  -We discussed the Ddx for pelvic pain and dyspareunia and based on her worsening dysmenorrhea, I do think that endometriosis is a possibility. We discussed that for a definitive diagnosis to be made, we would need to do a pelvic laparoscopy but it would also be reasonable to do a trial of menstrual suppression with medication (like the IUD) which she was already considering and agrees that IUD placement would be a good next step.   -Pelvic US to rule out structural causes  -Patient was also referred to pelvic floor PT but hasn't scheduled her first appointment yet but planning to do so in the future.   Plan: US Pelvic Complete with Transvaginal       Dispo: RTC for pelvic US and Mirena IUD placement    Nola Christina MD

## 2024-12-12 LAB
C TRACH DNA SPEC QL NAA+PROBE: NEGATIVE
N GONORRHOEA DNA SPEC QL NAA+PROBE: NEGATIVE

## 2024-12-16 LAB
BKR LAB AP GYN ADEQUACY: NORMAL
BKR LAB AP GYN INTERPRETATION: NORMAL
BKR LAB AP HPV REFLEX: NORMAL
BKR LAB AP LMP: NORMAL
BKR LAB AP PREVIOUS ABNORMAL: NORMAL
PATH REPORT.COMMENTS IMP SPEC: NORMAL
PATH REPORT.COMMENTS IMP SPEC: NORMAL
PATH REPORT.RELEVANT HX SPEC: NORMAL

## 2025-01-03 PROBLEM — Z87.440 PERSONAL HISTORY OF URINARY TRACT INFECTION: Status: ACTIVE | Noted: 2025-01-03

## 2025-01-03 PROBLEM — N94.10 DYSPAREUNIA IN FEMALE: Status: ACTIVE | Noted: 2025-01-03

## 2025-01-03 PROBLEM — N39.3 FEMALE STRESS INCONTINENCE: Status: ACTIVE | Noted: 2025-01-03

## 2025-01-06 ENCOUNTER — TRANSFERRED RECORDS (OUTPATIENT)
Dept: HEALTH INFORMATION MANAGEMENT | Facility: CLINIC | Age: 20
End: 2025-01-06
Payer: COMMERCIAL

## 2025-01-13 ENCOUNTER — TELEPHONE (OUTPATIENT)
Dept: UROLOGY | Facility: CLINIC | Age: 20
End: 2025-01-13
Payer: COMMERCIAL

## 2025-01-13 DIAGNOSIS — R30.0 DYSURIA: ICD-10-CM

## 2025-01-13 DIAGNOSIS — N89.8 VAGINAL DISCHARGE: Primary | ICD-10-CM

## 2025-01-13 NOTE — TELEPHONE ENCOUNTER
[facility] Call Center    Phone Message    May a detailed message be left on voicemail: yes     Reason for Call: Other: Patient calls about ED visit last weekend for UTI. Patient does not think that urine was cultured and is concerned that she is not on the correct antibiotic. Patient is reporting worsening symptoms and is requesting a call back.     Action Taken: Message routed to:  Other: Urology    Travel Screening: Not Applicable

## 2025-01-13 NOTE — TELEPHONE ENCOUNTER
"Called placed to patient and message left. Informed her that there is not a urine culture report, likely due to noted \"interfering substance\" on her UA report. Let her know that she will have to give another urine sample in order for us to see what bacteria she has. If she is taking pyridium, she would need to be off of that for 24 hours first. My direct line given for her to call back.      Thank you,  Gail Quiroga RN, BSN   Urology Triage Nurse    "

## 2025-01-13 NOTE — TELEPHONE ENCOUNTER
Patient states that she took Cephalexin for her UTI symptoms. States that symptoms went away, but then came right back. This is her 2nd UTI in 3 months. Will put in UA/UC. Patient states she also thinks she has a Yeast infection or BV and would like a Multiplex panel done, put in that order as well and scheduled patient for tomorrow at 10:30 am.

## 2025-01-14 ENCOUNTER — LAB (OUTPATIENT)
Dept: LAB | Facility: CLINIC | Age: 20
End: 2025-01-14
Payer: COMMERCIAL

## 2025-01-14 ENCOUNTER — ALLIED HEALTH/NURSE VISIT (OUTPATIENT)
Dept: OBGYN | Facility: CLINIC | Age: 20
End: 2025-01-14
Attending: REGISTERED NURSE
Payer: COMMERCIAL

## 2025-01-14 DIAGNOSIS — B96.89 BACTERIAL VAGINOSIS: Primary | ICD-10-CM

## 2025-01-14 DIAGNOSIS — R30.0 DYSURIA: ICD-10-CM

## 2025-01-14 DIAGNOSIS — N76.0 BACTERIAL VAGINOSIS: Primary | ICD-10-CM

## 2025-01-14 LAB
BACTERIA #/AREA URNS HPF: ABNORMAL /HPF
BACTERIAL VAGINOSIS VAG-IMP: NEGATIVE
CANDIDA DNA VAG QL NAA+PROBE: NOT DETECTED
CANDIDA GLABRATA / CANDIDA KRUSEI DNA: NOT DETECTED
RBC #/AREA URNS AUTO: ABNORMAL /HPF
SQUAMOUS #/AREA URNS AUTO: ABNORMAL /LPF
T VAGINALIS DNA VAG QL NAA+PROBE: NOT DETECTED
WBC #/AREA URNS AUTO: >100 /HPF

## 2025-01-14 PROCEDURE — 87186 SC STD MICRODIL/AGAR DIL: CPT

## 2025-01-14 PROCEDURE — 87086 URINE CULTURE/COLONY COUNT: CPT

## 2025-01-14 PROCEDURE — 81515 NFCT DS BV&VAGINITIS DNA ALG: CPT

## 2025-01-14 PROCEDURE — 81015 MICROSCOPIC EXAM OF URINE: CPT

## 2025-01-15 ENCOUNTER — PATIENT OUTREACH (OUTPATIENT)
Dept: CARE COORDINATION | Facility: CLINIC | Age: 20
End: 2025-01-15
Payer: COMMERCIAL

## 2025-01-15 LAB — BACTERIA UR CULT: ABNORMAL

## 2025-01-16 DIAGNOSIS — N39.0 URINARY TRACT INFECTION: Primary | ICD-10-CM

## 2025-01-16 RX ORDER — NITROFURANTOIN 25; 75 MG/1; MG/1
100 CAPSULE ORAL 2 TIMES DAILY
Qty: 10 CAPSULE | Refills: 0 | Status: SHIPPED | OUTPATIENT
Start: 2025-01-16 | End: 2025-01-21

## 2025-01-16 RX ORDER — FLUCONAZOLE 150 MG/1
150 TABLET ORAL ONCE
Qty: 1 TABLET | Refills: 0 | Status: SHIPPED | OUTPATIENT
Start: 2025-01-16 | End: 2025-01-16

## 2025-01-16 NOTE — TELEPHONE ENCOUNTER
Nitrofurantoin 100 mg po BID for 5 days ordered per protocol for positive UTI per urine culture. Call placed to patient and message left to let her know.      Thank you,  Gail Quiroga RN, BSN   Urology Triage Nurse

## 2025-01-28 ENCOUNTER — OFFICE VISIT (OUTPATIENT)
Dept: RHEUMATOLOGY | Facility: CLINIC | Age: 20
End: 2025-01-28
Payer: COMMERCIAL

## 2025-01-28 VITALS
DIASTOLIC BLOOD PRESSURE: 73 MMHG | BODY MASS INDEX: 19.66 KG/M2 | OXYGEN SATURATION: 98 % | WEIGHT: 120.9 LBS | HEART RATE: 68 BPM | SYSTOLIC BLOOD PRESSURE: 116 MMHG

## 2025-01-28 DIAGNOSIS — M54.50 CHRONIC BILATERAL LOW BACK PAIN WITHOUT SCIATICA: Primary | ICD-10-CM

## 2025-01-28 DIAGNOSIS — G89.29 CHRONIC BILATERAL LOW BACK PAIN WITHOUT SCIATICA: Primary | ICD-10-CM

## 2025-01-28 DIAGNOSIS — M54.2 CERVICALGIA: ICD-10-CM

## 2025-01-28 DIAGNOSIS — M25.50 POLYARTHRALGIA: ICD-10-CM

## 2025-01-28 PROCEDURE — 99214 OFFICE O/P EST MOD 30 MIN: CPT | Performed by: INTERNAL MEDICINE

## 2025-01-28 PROCEDURE — G2211 COMPLEX E/M VISIT ADD ON: HCPCS | Performed by: INTERNAL MEDICINE

## 2025-01-28 NOTE — PROGRESS NOTES
"      Rheumatology follow-up visit note     Sandra is a 19 year old female presents today for follow-up.    Kristen was seen today for recheck.    Diagnoses and all orders for this visit:    Chronic bilateral low back pain without sciatica  -     MRI Lumbar spine w/o contrast; Future  -     MR Sacroilliac Joints wo Contrast; Future    Polyarthralgia    Cervicalgia        The longitudinal plan of care for the diagnosis(es)/condition(s) as documented were addressed during this visit. Due to the added complexity in care, I will continue to support Kristen in the subsequent management and with ongoing continuity of care.      Follow up in 2 months.    HPI    Sandra Moraes is a 19 year old female is here for follow-up of    polyarthralgias neck and back pain.  She is accompanied by her mother.  The symptoms go back 10+ years.  Even as a child they both recall that she would hurt in her neck and back.  She has been going to the chiropractors for several years.  She has had over the time increasing pain in various areas and as we went through most of those joint regions it would appear that hardly any joint has been spared from where she has not ambulated pain.  The worst of the pain is in her neck and lower back.  Her recent workup has not revealed signal to suggest inflammatory spondyloarthropathy as the cause of her symptoms.  There are no usual risk factors for the same group of conditions.  Given her age and ongoing symptoms I have recommended that 1 further step may be undertaken an MRI of the lumbar spine along with the sacroiliac joints will be ordered today.   She takes acetaminophen, ibuprofen over-the-counter.  She feels that it provides her with \"15\" percent improvement if that.  She reports no pleuritic symptoms she has never been pregnant.  She describes no features suggestive of uveitis/iritis.  No features suggestive of erythema nodosum..  She works as a .  End of the day her pains get worse.  In the " "morning she is stiff all over that can be as long as 3 to 4 hours.  As a child she was quite flexible less so now.  She is not a smoker.       Following is the excerpt from a previous note:    Ordered she is also had pain in her knees ankles and hands.  She reports hip pain.  She has noted these pains trouble her on daily basis there are some days when it is so hard for her to get up and get out of the bed.  She has not observed swelling in any of her joints however.  There is been no history of psoriasis ulcerative colitis or Crohn's disease herself or the family.  She has longstanding GI symptoms for which she has been seen at [facility] further workup is underway including endoscopy.  She also gets \"frequent UTIs even before she has an abnormal urinalysis she would get the symptoms.  She has followed up in urology.  She recalls having had injury to the right shoulder where she had a labral tear and repair.  She has been to orthopedics.  She has had MRI and other imaging of the knees without abnormality detected.  She reports no mouth ulcers, photosensitivity indeed she would like to be in cooler or darker place.  She finds that her undergarments clothes sometimes feel too uncomfortable.  She wakes up in the morning typically on refreshed.  She has had some of the biggest improvements in her symptoms with acupuncture.     DETAILED EXAMINATION  01/28/25  :    Vitals:    01/28/25 1044   BP: 116/73   BP Location: Right arm   Pulse: 68   SpO2: 98%   Weight: 54.8 kg (120 lb 14.4 oz)     Alert oriented. Head including the face is examined for malar rash, heliotropes, scarring, lupus pernio. Eyes examined for redness such as in episcleritis/scleritis, periorbital lesions.   Neck/ Face examined for parotid gland swelling, range of motion of neck.  Left upper and lower and right upper and lower extremities examined for tenderness, swelling, warmth of the appendicular joints, range of motion, edema, rash.  " Some of the important findings included: she does not have evidence of synovitis in the palpable joints of the upper extremities.  No significant deformities of the digits.  NO Heberden nodes.  Range of motion of the shoulders  show full abduction.  No JLT effusion or warmth of the knees.  she does not have dactylitis of the digits.     Patient Active Problem List    Diagnosis Date Noted    Dyspareunia in female 01/03/2025     Priority: Medium    Female stress incontinence 01/03/2025     Priority: Medium    Personal history of urinary tract infection 01/03/2025     Priority: Medium    Abdominal bloating 08/06/2024     Priority: Medium    Back pain 08/06/2024     Priority: Medium    Chest pain 08/06/2024     Priority: Medium    Chronic pain 08/06/2024     Priority: Medium    Iron deficiency 08/06/2024     Priority: Medium    Irregular bowel habits 08/06/2024     Priority: Medium    Panic attack 08/06/2024     Priority: Medium    Reduced mobility 08/06/2024     Priority: Medium    Abdominal distension, gaseous 10/28/2022     Priority: Medium    Digestive symptom 10/28/2022     Priority: Medium    Attention deficit hyperactivity disorder (ADHD), predominantly inattentive type 02/17/2022     Priority: Medium    COVID-19 08/05/2020     Priority: Medium     Past Surgical History:   Procedure Laterality Date    ENT SURGERY  11/2021    tonsillectomy    SHOULDER SURGERY Left 09/2023      Past Medical History:   Diagnosis Date    ADHD (attention deficit hyperactivity disorder)     Depressive disorder 2019    Uncomplicated asthma 2005     No Known Allergies  Current Outpatient Medications   Medication Sig Dispense Refill    amphetamine-dextroamphetamine (ADDERALL XR) 15 MG 24 hr capsule Take 15 mg by mouth every morning.      norgestimate-ethinyl estradiol (ORTHO-CYCLEN) 0.25-35 MG-MCG tablet Take 1 tablet by mouth daily 90 tablet 3     family history includes Anxiety Disorder in her father; Depression in her father; Mental  Illness in her father; Thyroid Disease in her mother.  Social Connections: Unknown (2/21/2024)    Received [facility]    Social Connections     In the past 3 months, do you feel that you lack companionship or social support?: Not on file          WBC   Date Value Ref Range Status   04/19/2019 4.1 4.0 - 11.0 10e9/L Final     WBC Count   Date Value Ref Range Status   07/12/2022 4.9 4.0 - 11.0 10e3/uL Final     RBC Count   Date Value Ref Range Status   07/12/2022 4.92 3.80 - 5.20 10e6/uL Final   04/19/2019 5.28 3.7 - 5.3 10e12/L Final     Hemoglobin   Date Value Ref Range Status   09/05/2023 14.4 11.7 - 15.7 g/dL Final   04/19/2019 15.4 11.7 - 15.7 g/dL Final     Hematocrit   Date Value Ref Range Status   07/12/2022 41.9 35.0 - 47.0 % Final   04/19/2019 45.2 35.0 - 47.0 % Final     MCV   Date Value Ref Range Status   07/12/2022 85 78 - 100 fL Final   04/19/2019 86 77 - 100 fl Final     MCH   Date Value Ref Range Status   07/12/2022 28.3 26.5 - 33.0 pg Final   04/19/2019 29.2 26.5 - 33.0 pg Final     Platelet Count   Date Value Ref Range Status   07/12/2022 266 150 - 450 10e3/uL Final   04/19/2019 277 150 - 450 10e9/L Final     % Lymphocytes   Date Value Ref Range Status   12/04/2021 52 % Final   04/19/2019 39.6 % Final     AST   Date Value Ref Range Status   08/09/2023 28 0 - 45 U/L Final     Comment:     Reference intervals for this test were updated on 6/12/2023 to more accurately reflect our healthy population. There may be differences in the flagging of prior results with similar values performed with this method. Interpretation of those prior results can be made in the context of the updated reference intervals.     ALT   Date Value Ref Range Status   08/09/2023 21 0 - 50 U/L Final     Comment:     Reference intervals for this test were updated on 6/12/2023 to more accurately reflect our healthy population. There may be differences in the flagging of prior results with similar values performed with this  method. Interpretation of those prior results can be made in the context of the updated reference intervals.       Sed Rate   Date Value Ref Range Status   04/19/2019 4 0 - 15 mm/h Final     Erythrocyte Sedimentation Rate   Date Value Ref Range Status   10/04/2024 4 0 - 20 mm/hr Final     CRP Inflammation   Date Value Ref Range Status   04/19/2019 <2.9 0.0 - 8.0 mg/L Final

## 2025-01-29 ENCOUNTER — PATIENT OUTREACH (OUTPATIENT)
Dept: CARE COORDINATION | Facility: CLINIC | Age: 20
End: 2025-01-29
Payer: COMMERCIAL

## 2025-02-05 ENCOUNTER — OFFICE VISIT (OUTPATIENT)
Dept: UROLOGY | Facility: CLINIC | Age: 20
End: 2025-02-05
Attending: UROLOGY
Payer: COMMERCIAL

## 2025-02-05 VITALS
HEART RATE: 86 BPM | WEIGHT: 118.6 LBS | BODY MASS INDEX: 19.29 KG/M2 | SYSTOLIC BLOOD PRESSURE: 106 MMHG | DIASTOLIC BLOOD PRESSURE: 66 MMHG

## 2025-02-05 DIAGNOSIS — Z87.440 PERSONAL HISTORY OF URINARY TRACT INFECTION: Primary | ICD-10-CM

## 2025-02-05 PROCEDURE — G0463 HOSPITAL OUTPT CLINIC VISIT: HCPCS | Performed by: UROLOGY

## 2025-02-05 RX ORDER — NITROFURANTOIN 25; 75 MG/1; MG/1
100 CAPSULE ORAL DAILY PRN
Qty: 30 CAPSULE | Refills: 3 | Status: SHIPPED | OUTPATIENT
Start: 2025-02-05

## 2025-02-05 NOTE — PATIENT INSTRUCTIONS
Websites with free information:    [employer] patient website: www.voicesforpfd.org    Total Control Program: www.totalcontrolprogram.com    Supplements to prevent UTI to consider  -Probiotics  -Cranberry (for these products let them know a doctor is recommending them)   Saba: https://[email or web address]   Theracran HP by [employer] [facility] [address]  -d-mannose 2gm daily  -Vitamin C 500-1000mg twice a day    Urology clinic [phone] OR Hansa OSORIO -[phone]    It was a pleasure meeting with you today.  Thank you for allowing me and my team the privilege of caring for you today.  YOU are the reason we are here, and I truly hope we provided you with the excellent service you deserve.  Please let us know if there is anything else we can do for you so that we can be sure you are leaving completely satisfied with your care experience.

## 2025-02-05 NOTE — PROGRESS NOTES
February 5, 2025    Kristen was seen today for follow up.    Diagnoses and all orders for this visit:    Personal history of urinary tract infection  -     nitroFURantoin macrocrystal-monohydrate (MACROBID) 100 MG capsule; Take 1 capsule (100 mg) by mouth daily as needed (as needed after intercourse).       At this time she continues to have UTIs and she thinks her UTIs are more related to intercourse so would like to trial postcoital antibiotics.  We discussed risks and benefits to this and have elected to trial postcoital macrobid.  Risks discussed include but not limited to not working, antibiotic resistance, and rare risk of pulmonary fibrosis with the medication    RTC 3 months, consider cystoscopy if continues      15 minutes were spent today on the day of the encounter in reviewing the EMR urinalysis, urine culture, direct patient care including prescription medications, coordination of care and documentation    Gretchen Greenwood MD MPH  (she/her/hers)   of Urology  [facility]    Subjective    Had a UTI in January after her upper/lower endoscopy.  Another on happened after intercourse.    Stopped cranberry and d-mannose    She denies any changes in health since last visit    /66   Pulse 86   Wt 53.8 kg (118 lb 9.6 oz)   LMP 01/10/2025 (Exact Date)   BMI 19.29 kg/m    GENERAL: healthy, alert and no distress  EYES: Eyes grossly normal to inspection, conjunctivae and sclerae normal  HENT: normal cephalic/atraumatic.  External ears, nose and mouth without ulcers or lesions.  RESP: no audible wheeze, cough, or visible cyanosis.  No visible retractions or increased work of breathing.  Able to speak fully in complete sentences.  NEURO: Cranial nerves grossly intact, mentation intact and speech normal  PSYCH: mentation appears normal, affect normal/bright, judgement and insight intact, normal speech and appearance well-groomed    Labs 1/14/25  Urinalysis  RBC, > 100  WBC  Urine culture  K E coli    CC  Patient Care Team:  Dayna Coy DO as PCP - General (Family Medicine)  Sydni Figueredo MD as MD (OB/Gyn)  Wendy Wallace MD as Physician (Infectious Diseases)  Alba Grossman MBBS as MD (Rheumatology)  Dayna Coy DO as Assigned PCP  Gretchen Greenwood MD as Assigned Surgical Provider  Alba Grossman MBBS as Assigned Rheumatology Provider  Nola Christina MD as Assigned OBGYN Provider

## 2025-02-05 NOTE — LETTER
2/5/2025       RE: Sandra Moraes  [address] [place] [facility] [id number]     Dear Colleague,    Thank you for referring your patient, Sandra Moraes, to the [facility] [place] at [facility]. Please see a copy of my visit note below.    February 5, 2025    Kristen was seen today for follow up.    Diagnoses and all orders for this visit:    Personal history of urinary tract infection  -     nitroFURantoin macrocrystal-monohydrate (MACROBID) 100 MG capsule; Take 1 capsule (100 mg) by mouth daily as needed (as needed after intercourse).       At this time she continues to have UTIs and she thinks her UTIs are more related to intercourse so would like to trial postcoital antibiotics.  We discussed risks and benefits to this and have elected to trial postcoital macrobid.  Risks discussed include but not limited to not working, antibiotic resistance, and rare risk of pulmonary fibrosis with the medication    RTC 3 months, consider cystoscopy if continues      15 minutes were spent today on the day of the encounter in reviewing the EMR urinalysis, urine culture, direct patient care including prescription medications, coordination of care and documentation    Gretchen Greenwood MD MPH  (she/her/hers)   of Urology  [facility]    Subjective    Had a UTI in January after her upper/lower endoscopy.  Another on happened after intercourse.    Stopped cranberry and d-mannose    She denies any changes in health since last visit    /66   Pulse 86   Wt 53.8 kg (118 lb 9.6 oz)   LMP 01/10/2025 (Exact Date)   BMI 19.29 kg/m    GENERAL: healthy, alert and no distress  EYES: Eyes grossly normal to inspection, conjunctivae and sclerae normal  HENT: normal cephalic/atraumatic.  External ears, nose and mouth without ulcers or lesions.  RESP: no audible wheeze, cough, or visible cyanosis.  No visible retractions or increased  work of breathing.  Able to speak fully in complete sentences.  NEURO: Cranial nerves grossly intact, mentation intact and speech normal  PSYCH: mentation appears normal, affect normal/bright, judgement and insight intact, normal speech and appearance well-groomed    Labs 1/14/25  Urinalysis  RBC, > 100 WBC  Urine culture  K E coli    CC  Patient Care Team:  Dayna Coy DO as PCP - General (Family Medicine)  Sydni Figueredo MD as MD (OB/Gyn)  Wendy Wallace MD as Physician (Infectious Diseases)  Alba Grossman MBBS as MD (Rheumatology)  Dayna Coy DO as Assigned PCP  Gretchen Greenwood MD as Assigned Surgical Provider  Alba Grossman MBBS as Assigned Rheumatology Provider  Nola Christina MD as Assigned OBGYN Provider          Again, thank you for allowing me to participate in the care of your patient.      Sincerely,    Gretchen Greenwood MD

## 2025-02-06 ENCOUNTER — OFFICE VISIT (OUTPATIENT)
Dept: OBGYN | Facility: CLINIC | Age: 20
End: 2025-02-06
Attending: OBSTETRICS & GYNECOLOGY
Payer: COMMERCIAL

## 2025-02-06 ENCOUNTER — ANCILLARY PROCEDURE (OUTPATIENT)
Dept: ULTRASOUND IMAGING | Facility: CLINIC | Age: 20
End: 2025-02-06
Attending: OBSTETRICS & GYNECOLOGY
Payer: COMMERCIAL

## 2025-02-06 VITALS
BODY MASS INDEX: 19.48 KG/M2 | DIASTOLIC BLOOD PRESSURE: 74 MMHG | OXYGEN SATURATION: 99 % | HEART RATE: 84 BPM | SYSTOLIC BLOOD PRESSURE: 111 MMHG | WEIGHT: 119.8 LBS

## 2025-02-06 DIAGNOSIS — Z30.430 ENCOUNTER FOR IUD INSERTION: Primary | ICD-10-CM

## 2025-02-06 DIAGNOSIS — R10.2 PELVIC PAIN IN FEMALE: ICD-10-CM

## 2025-02-06 DIAGNOSIS — N94.6 DYSMENORRHEA: ICD-10-CM

## 2025-02-06 LAB — HCG UR QL: NEGATIVE

## 2025-02-06 PROCEDURE — 76830 TRANSVAGINAL US NON-OB: CPT | Performed by: OBSTETRICS & GYNECOLOGY

## 2025-02-06 PROCEDURE — 76856 US EXAM PELVIC COMPLETE: CPT | Performed by: OBSTETRICS & GYNECOLOGY

## 2025-02-06 NOTE — PROGRESS NOTES
GYN Progress Note     CC: Follow up for dysmenorrhea    HPI: Sandra Moraes is a 19 year old  who presents to clinic for follow up due to a history of dysmenorrhea. She was last seen for this concern 12/10/2024. At that time we discussed possible etiologies and treatments of dysmenorrhea and she elected to proceed with the Mirena IUD. She also had a pelvic US today to rule out structural causes. Final report still pending, but normal pelvic US on my review of images. She denies any other concerns today.     O: /74 (BP Location: Left arm, Patient Position: Sitting, Cuff Size: Adult Regular)   Pulse 84   Wt 54.3 kg (119 lb 12.8 oz)   LMP 01/10/2025 (Exact Date)   SpO2 99%   BMI 19.48 kg/m      Mirena IUD Insertion Procedure Note      After a discussion of her options for contraception, the patient was interested in a Mirena IUD. We reviewed the risks, benefits and alternatives. The patient was counseled that she may have irregular bleeding for the first 3-6 months. She understands that 70% of women are oligomenorrheic and 30-40% are amenorrheic within 2 years. The patient was informed that the Mirena is FDA approved for 5 years however the IUD can be removed at any time. We reviewed risks of uterine perforation and she was made aware that the risk of uterine perforation that might require abdominal surgery is 1/1,000 insertions. She understands that rate of expulsion in the first year of use is 2-10%. There is also a 1% risk of infection in the first 20 days after insertion. We reviewed that the IUD is a very effective form of birth control however IUDs do have a low risk of failure to prevent pregnancy. We reviewed that the of Mirena IUD is 5-11/1,000 women. In case of pregnancy after failure of IUD, the risk of ectopic pregnancy is increased. After thorough counseling regarding risks and benefits of the IUD, at which time the patient's questions were answered to her apparent satisfaction, consent  for insertion was obtained.    A timeout was performed and the correct patient and procedure were verified.    A bimanual exam was performed and the uterus was found to be anteverted.  A speculum exam was performed and the cervix was visualized and prepped with betadine.  A single tooth tenaculum was placed on the anterior lip of the cervix.  The uterus sounded to 8 cm.  The Mirena IUD was placed without difficulty using standard insertion technique. The strings were trimmed to about 3 cm long. The patient tolerated the procedure well. EBL minimal.      Assessment and plan:   Sandra Moraes is a 19 year old  who presents to clinic for follow up due to a the following concerns:   (Z30.413) Encounter for IUD insertion  (primary encounter diagnosis)  -IUD inserted without difficulty   -The patient was instructed on checking for the IUD strings. The patient was counseled that if she is ever unable to feel strings to contact me to be seen and use condoms until we can ensure the IUD is in the proper location.  -The patient was instructed to call if she experiences heavy bleeding, abdominal pain, or any other new concerns. The patient voiced understanding.  Plan: HCG qualitative urine, levonorgestrel (MIRENA)         52 MG (20 mcg/day) IUD 1 each, INSERTION         INTRAUTERINE DEVICE          (N94.6) Dysmenorrhea  -We reviewed that the prelim review of her US was negative for any structural abnormalities but that this does not exclude endometriosis/adenomyosis as a possible etiology.   -IUD placed today, patient will reach out if dysmenorrhea not improving after IUD insertion         Dispo: as needed     Noal Christina MD

## 2025-02-06 NOTE — PATIENT INSTRUCTIONS

## 2025-02-12 ENCOUNTER — TRANSFERRED RECORDS (OUTPATIENT)
Dept: HEALTH INFORMATION MANAGEMENT | Facility: CLINIC | Age: 20
End: 2025-02-12
Payer: COMMERCIAL

## 2025-03-26 ENCOUNTER — HOSPITAL ENCOUNTER (OUTPATIENT)
Dept: MRI IMAGING | Facility: CLINIC | Age: 20
Discharge: HOME | End: 2025-03-26
Attending: INTERNAL MEDICINE
Payer: COMMERCIAL

## 2025-03-26 DIAGNOSIS — G89.29 CHRONIC BILATERAL LOW BACK PAIN WITHOUT SCIATICA: ICD-10-CM

## 2025-03-26 DIAGNOSIS — M54.50 CHRONIC BILATERAL LOW BACK PAIN WITHOUT SCIATICA: ICD-10-CM

## 2025-03-26 PROCEDURE — 72195 MRI PELVIS W/O DYE: CPT

## 2025-03-26 PROCEDURE — 72148 MRI LUMBAR SPINE W/O DYE: CPT

## 2025-03-26 PROCEDURE — 72195 MRI PELVIS W/O DYE: CPT | Mod: 26 | Performed by: RADIOLOGY

## 2025-03-27 ENCOUNTER — OFFICE VISIT (OUTPATIENT)
Dept: RHEUMATOLOGY | Facility: CLINIC | Age: 20
End: 2025-03-27
Payer: COMMERCIAL

## 2025-03-27 ENCOUNTER — LAB (OUTPATIENT)
Dept: LAB | Facility: CLINIC | Age: 20
End: 2025-03-27
Payer: COMMERCIAL

## 2025-03-27 VITALS
DIASTOLIC BLOOD PRESSURE: 80 MMHG | SYSTOLIC BLOOD PRESSURE: 120 MMHG | HEART RATE: 80 BPM | BODY MASS INDEX: 20.18 KG/M2 | WEIGHT: 124.1 LBS | OXYGEN SATURATION: 100 %

## 2025-03-27 DIAGNOSIS — G89.29 CHRONIC BILATERAL LOW BACK PAIN WITHOUT SCIATICA: ICD-10-CM

## 2025-03-27 DIAGNOSIS — M54.50 CHRONIC BILATERAL LOW BACK PAIN WITHOUT SCIATICA: Primary | ICD-10-CM

## 2025-03-27 DIAGNOSIS — M54.50 CHRONIC BILATERAL LOW BACK PAIN WITHOUT SCIATICA: ICD-10-CM

## 2025-03-27 DIAGNOSIS — G89.29 CHRONIC BILATERAL LOW BACK PAIN WITHOUT SCIATICA: Primary | ICD-10-CM

## 2025-03-27 DIAGNOSIS — M54.2 CERVICALGIA: ICD-10-CM

## 2025-03-27 RX ORDER — DEXTROAMPHETAMINE SACCHARATE, AMPHETAMINE ASPARTATE, DEXTROAMPHETAMINE SULFATE AND AMPHETAMINE SULFATE 2.5; 2.5; 2.5; 2.5 MG/1; MG/1; MG/1; MG/1
10 TABLET ORAL PRN
COMMUNITY
Start: 2025-03-17

## 2025-03-27 RX ORDER — CELECOXIB 200 MG/1
200 CAPSULE ORAL DAILY
Qty: 30 CAPSULE | Refills: 1 | Status: SHIPPED | OUTPATIENT
Start: 2025-03-27 | End: 2025-04-26

## 2025-03-27 NOTE — PROGRESS NOTES
Rheumatology follow-up visit note     Sandra is a 19 year old female presents today for follow-up.    Kristen was seen today for recheck.    Diagnoses and all orders for this visit:    Chronic bilateral low back pain without sciatica  -     celecoxib (CELEBREX) 200 MG capsule; Take 1 capsule (200 mg) by mouth daily.  -     HLA-B27 Typing; Future    Cervicalgia  -     celecoxib (CELEBREX) 200 MG capsule; Take 1 capsule (200 mg) by mouth daily.        The longitudinal plan of care for the diagnosis(es)/condition(s) as documented were addressed during this visit. Due to the added complexity in care, I will continue to support Kristen in the subsequent management and with ongoing continuity of care.      Follow up in 2 months.    HPI    Sandra Moraes is a 19 year old female is here for follow-up of  polyarthralgias neck and back pain.  She is accompanied by her mother.  The symptoms go back 10+ years.  Even as a child they both recall that she would hurt in her neck and back.  She has been going to the chiropractors for several years.  She has had over the time increasing pain in various areas and as we went through most of those joint regions it would appear that hardly any joint has been spared from where she has not ambulated pain.  The worst of the pain is in her neck and lower back.  Her recent workup has not revealed signal to suggest inflammatory spondyloarthropathy as the cause of her symptoms.  However given some of her symptoms further imaging was undertaken including MRI of the lumbar spine and the sacroiliac joints the earlier is noted to be normal the latter very mild edema bilaterally subchondral sacroiliac.  While the location of the MR changes is different from the bulk of her pain which is more cranial the lumbar the question of if this might be referred pain causing her the symptoms while she has the slight abnormality is essentially asymptomatic to be considered.   Of late flared she has had pain  "in her right shoulder where she had labral tear repair in the past.    Following is the excerpt from a previous note:     There are no usual risk factors for the same group of conditions.  Given her age and ongoing symptoms I have recommended that 1 further step may be undertaken an MRI of the lumbar spine along with the sacroiliac joints will be ordered today.   She takes acetaminophen, ibuprofen over-the-counter.  She feels that it provides her with \"15\" percent improvement if that.  She reports no pleuritic symptoms she has never been pregnant.  She describes no features suggestive of uveitis/iritis.  No features suggestive of erythema nodosum..  She works as a .  End of the day her pains get worse.  In the morning she is stiff all over that can be as long as 3 to 4 hours.  As a child she was quite flexible less so now.  She is not a smoker.  mpression: Very mild right greater than left subchondral edema like  marrow signal intensity at the bilateral sacroiliac joint sacral  aspects, possible early sacroiliitis is in differential.     DETAILED EXAMINATION  03/27/25  :    Vitals:    03/27/25 0811   BP: 120/80   BP Location: Right arm   Patient Position: Sitting   Cuff Size: Adult Regular   Pulse: 80   SpO2: 100%   Weight: 56.3 kg (124 lb 1.6 oz)     Alert oriented. Head including the face is examined for malar rash, heliotropes, scarring, lupus pernio. Eyes examined for redness such as in episcleritis/scleritis, periorbital lesions.   Neck/ Face examined for parotid gland swelling, range of motion of neck.  Left upper and lower and right upper and lower extremities examined for tenderness, swelling, warmth of the appendicular joints, range of motion, edema, rash.  Some of the important findings included: she does not have evidence of synovitis in the palpable joints of the upper extremities.  No significant deformities of the digits.    [place]berden nodes.  Range of motion of the shoulders  show full abduction.  " No JLT effusion or warmth of the knees.  she does not have dactylitis of the digits.  She is minimally if at all tender in the sacroiliac area.  Patient Active Problem List    Diagnosis Date Noted    Dyspareunia in female 01/03/2025     Priority: Medium    Female stress incontinence 01/03/2025     Priority: Medium    Personal history of urinary tract infection 01/03/2025     Priority: Medium    Abdominal bloating 08/06/2024     Priority: Medium    Back pain 08/06/2024     Priority: Medium    Chest pain 08/06/2024     Priority: Medium    Chronic pain 08/06/2024     Priority: Medium    Iron deficiency 08/06/2024     Priority: Medium    Irregular bowel habits 08/06/2024     Priority: Medium    Panic attack 08/06/2024     Priority: Medium    Reduced mobility 08/06/2024     Priority: Medium    Abdominal distension, gaseous 10/28/2022     Priority: Medium    Digestive symptom 10/28/2022     Priority: Medium    Attention deficit hyperactivity disorder (ADHD), predominantly inattentive type 02/17/2022     Priority: Medium    COVID-19 08/05/2020     Priority: Medium     Past Surgical History:   Procedure Laterality Date    ENT SURGERY  11/2021    tonsillectomy    SHOULDER SURGERY Left 09/2023      Past Medical History:   Diagnosis Date    ADHD (attention deficit hyperactivity disorder)     Depressive disorder 2019    Uncomplicated asthma 2005     No Known Allergies  Current Outpatient Medications   Medication Sig Dispense Refill    amphetamine-dextroamphetamine (ADDERALL XR) 15 MG 24 hr capsule Take 15 mg by mouth every morning.      nitroFURantoin macrocrystal-monohydrate (MACROBID) 100 MG capsule Take 1 capsule (100 mg) by mouth daily as needed (as needed after intercourse). 30 capsule 3     family history includes Anxiety Disorder in her father; Depression in her father; Mental Illness in her father; Thyroid Disease in her mother.  Social Connections: Unknown (2/21/2024)    Received from [facility]    [facility]  Connections     In the past 3 months, do you feel that you lack companionship or social support?: Not on file          WBC   Date Value Ref Range Status   04/19/2019 4.1 4.0 - 11.0 10e9/L Final     WBC Count   Date Value Ref Range Status   07/12/2022 4.9 4.0 - 11.0 10e3/uL Final     RBC Count   Date Value Ref Range Status   07/12/2022 4.92 3.80 - 5.20 10e6/uL Final   04/19/2019 5.28 3.7 - 5.3 10e12/L Final     Hemoglobin   Date Value Ref Range Status   09/05/2023 14.4 11.7 - 15.7 g/dL Final   04/19/2019 15.4 11.7 - 15.7 g/dL Final     Hematocrit   Date Value Ref Range Status   07/12/2022 41.9 35.0 - 47.0 % Final   04/19/2019 45.2 35.0 - 47.0 % Final     MCV   Date Value Ref Range Status   07/12/2022 85 78 - 100 fL Final   04/19/2019 86 77 - 100 fl Final     MCH   Date Value Ref Range Status   07/12/2022 28.3 26.5 - 33.0 pg Final   04/19/2019 29.2 26.5 - 33.0 pg Final     Platelet Count   Date Value Ref Range Status   07/12/2022 266 150 - 450 10e3/uL Final   04/19/2019 277 150 - 450 10e9/L Final     % Lymphocytes   Date Value Ref Range Status   12/04/2021 52 % Final   04/19/2019 39.6 % Final     AST   Date Value Ref Range Status   08/09/2023 28 0 - 45 U/L Final     Comment:     Reference intervals for this test were updated on 6/12/2023 to more accurately reflect our healthy population. There may be differences in the flagging of prior results with similar values performed with this method. Interpretation of those prior results can be made in the context of the updated reference intervals.     ALT   Date Value Ref Range Status   08/09/2023 21 0 - 50 U/L Final     Comment:     Reference intervals for this test were updated on 6/12/2023 to more accurately reflect our healthy population. There may be differences in the flagging of prior results with similar values performed with this method. Interpretation of those prior results can be made in the context of the updated reference intervals.       Sed Rate   Date Value  Ref Range Status   04/19/2019 4 0 - 15 mm/h Final     Erythrocyte Sedimentation Rate   Date Value Ref Range Status   10/04/2024 4 0 - 20 mm/hr Final     CRP Inflammation   Date Value Ref Range Status   04/19/2019 <2.9 0.0 - 8.0 mg/L Final

## 2025-03-30 ENCOUNTER — HEALTH MAINTENANCE LETTER (OUTPATIENT)
Age: 20
End: 2025-03-30

## 2025-04-08 LAB
B LOCUS: NORMAL
B27TEST METHOD: NORMAL

## 2025-06-19 ENCOUNTER — LAB (OUTPATIENT)
Dept: LAB | Facility: CLINIC | Age: 20
End: 2025-06-19
Payer: COMMERCIAL

## 2025-06-19 DIAGNOSIS — G89.29 CHRONIC BILATERAL LOW BACK PAIN WITHOUT SCIATICA: ICD-10-CM

## 2025-06-19 DIAGNOSIS — M54.2 CERVICALGIA: ICD-10-CM

## 2025-06-19 DIAGNOSIS — M54.50 CHRONIC BILATERAL LOW BACK PAIN WITHOUT SCIATICA: ICD-10-CM

## 2025-06-19 LAB
ALBUMIN SERPL BCG-MCNC: 4.9 G/DL (ref 3.5–5.2)
ALT SERPL W P-5'-P-CCNC: 18 U/L (ref 0–50)
CREAT SERPL-MCNC: 0.79 MG/DL (ref 0.51–0.95)
EGFRCR SERPLBLD CKD-EPI 2021: >90 ML/MIN/1.73M2
ERYTHROCYTE [DISTWIDTH] IN BLOOD BY AUTOMATED COUNT: 12.3 % (ref 10–15)
HCT VFR BLD AUTO: 41.8 % (ref 35–47)
HGB BLD-MCNC: 14.2 G/DL (ref 11.7–15.7)
MCH RBC QN AUTO: 30 PG (ref 26.5–33)
MCHC RBC AUTO-ENTMCNC: 34 G/DL (ref 31.5–36.5)
MCV RBC AUTO: 88 FL (ref 78–100)
PLATELET # BLD AUTO: 300 10E3/UL (ref 150–450)
RBC # BLD AUTO: 4.74 10E6/UL (ref 3.8–5.2)
WBC # BLD AUTO: 5.2 10E3/UL (ref 4–11)

## 2025-06-20 ENCOUNTER — RESULTS FOLLOW-UP (OUTPATIENT)
Dept: RHEUMATOLOGY | Facility: CLINIC | Age: 20
End: 2025-06-20

## 2025-07-01 ENCOUNTER — TELEPHONE (OUTPATIENT)
Dept: FAMILY MEDICINE | Facility: CLINIC | Age: 20
End: 2025-07-01
Payer: COMMERCIAL

## 2025-07-01 NOTE — TELEPHONE ENCOUNTER
Patient Quality Outreach    Patient is due for the following:   Asthma  -  ACT needed    Action(s) Taken:   Patient has upcoming appointment, these items will be addressed at that time.    Type of outreach:    Chart review performed, no outreach needed.    Questions for provider review:    None         Carolann Patel MA  Chart routed to None.

## 2025-07-03 ENCOUNTER — OFFICE VISIT (OUTPATIENT)
Dept: RHEUMATOLOGY | Facility: CLINIC | Age: 20
End: 2025-07-03
Payer: COMMERCIAL

## 2025-07-03 ENCOUNTER — LAB (OUTPATIENT)
Dept: LAB | Facility: CLINIC | Age: 20
End: 2025-07-03
Payer: COMMERCIAL

## 2025-07-03 VITALS
BODY MASS INDEX: 20.56 KG/M2 | HEART RATE: 84 BPM | WEIGHT: 126.4 LBS | SYSTOLIC BLOOD PRESSURE: 101 MMHG | OXYGEN SATURATION: 97 % | DIASTOLIC BLOOD PRESSURE: 67 MMHG

## 2025-07-03 DIAGNOSIS — G89.29 CHRONIC BILATERAL LOW BACK PAIN WITHOUT SCIATICA: ICD-10-CM

## 2025-07-03 DIAGNOSIS — M54.50 CHRONIC BILATERAL LOW BACK PAIN WITHOUT SCIATICA: ICD-10-CM

## 2025-07-03 DIAGNOSIS — M54.2 CERVICALGIA: ICD-10-CM

## 2025-07-03 DIAGNOSIS — M46.1 SACROILIITIS: ICD-10-CM

## 2025-07-03 DIAGNOSIS — M25.50 POLYARTHRALGIA: ICD-10-CM

## 2025-07-03 DIAGNOSIS — Z79.899 HIGH RISK MEDICATION USE: ICD-10-CM

## 2025-07-03 DIAGNOSIS — M46.1 SACROILIITIS: Primary | ICD-10-CM

## 2025-07-03 LAB
HBV SURFACE AG SERPL QL IA: NONREACTIVE
HCV AB SERPL QL IA: NONREACTIVE

## 2025-07-03 RX ORDER — LANOLIN ALCOHOL/MO/W.PET/CERES
1000 CREAM (GRAM) TOPICAL DAILY
COMMUNITY

## 2025-07-03 RX ORDER — FEXOFENADINE HCL 60 MG/1
60 TABLET, FILM COATED ORAL DAILY
COMMUNITY

## 2025-07-03 NOTE — PROGRESS NOTES
Rheumatology follow-up visit note     Sandra is a 20 year old female presents today for follow-up.    Kristen was seen today for recheck.    Diagnoses and all orders for this visit:    Sacroiliitis  -     Hepatitis C antibody; Future  -     Hepatitis B surface antigen; Future  -     Quantiferon-TB Gold Plus; Future  -     Adalimumab (HUMIRA *CF*) 40 MG/0.4ML pen kit; Inject 0.4 mLs (40 mg) subcutaneously every 14 days. Hold for signs of infection, then seek medical attention.    Chronic bilateral low back pain without sciatica    Cervicalgia    Polyarthralgia    High risk medication use  -     Hepatitis C antibody; Future  -     Hepatitis B surface antigen; Future  -     Quantiferon-TB Gold Plus; Future  -     Med Therapy Management Referral        This patient with longstanding back pain, lumbosacral, cervical, with some signals of inflammatory back pain, normal sed rate CRP, HLA-B27 negative, without personal or family history of psoriasis inflammatory bowel disease, and abnormal MRI of the sacroiliac joints, has improved only somewhat with the Celebrex perhaps 30%, we discussed the next option that would be a trial of tumor necrosis factor inhibitors pros and cons literature provided.  Check labs as noted.  Various scenarios going forward once she starts the Humira were outlined.  Will meet her in 3 months.    Follow up in 3 months.    HPI    Sandra Moraes is a 20 year old female is here for follow-up of back pain, this is lumbosacral, neck, involvement of the hands wrist knees.  She has had the symptoms going on for the past several years at least 10 if not more.  These data are reviewed and her previous visit and appended below.  During her workup she has been found to have negative HLA-B27, normal sed rate CRP.  No signals of autoimmunity.  None of the usual associations such as psoriasis ulcerative colitis Crohn's disease herself or the family.  Discovered.  The MRI of her sacroiliac joints suggestive  of inflammatory changes in the sacroiliac joints.  Celebrex was prescribed.  She feels it may have given her across approximately 30% relief.  Today ASDAS calculated at 2.7.     She is accompanied by her mother.        Following is the excerpt from a previous note:  The symptoms go back 10+ years.  Even as a child they both recall that she would hurt in her neck and back.  She has been going to the chiropractors for several years.  She has had over the time increasing pain in various areas and as we went through most of those joint regions it would appear that hardly any joint has been spared from where she has not ambulated pain.  The worst of the pain is in her neck and lower back.  Her recent workup has not revealed signal to suggest inflammatory spondyloarthropathy as the cause of her symptoms.  However given some of her symptoms further imaging was undertaken including MRI of the lumbar spine and the sacroiliac joints the earlier is noted to be normal the latter very mild edema bilaterally subchondral sacroiliac.  While the location of the MR changes is different from the bulk of her pain which is more cranial the lumbar the question of if this might be referred pain causing her the symptoms while she has the slight abnormality is essentially asymptomatic to be considered.   Of late flared she has had pain in her right shoulder where she had labral tear repair in the past.    DETAILED EXAMINATION  07/03/25  :    Vitals:    07/03/25 1104   BP: 101/67   BP Location: Right arm   Pulse: 84   SpO2: 97%   Weight: 57.3 kg (126 lb 6.4 oz)     Alert oriented. Head including the face is examined for malar rash, heliotropes, scarring, lupus pernio. Eyes examined for redness such as in episcleritis/scleritis, periorbital lesions.   Neck/ Face examined for parotid gland swelling, range of motion of neck.  Left upper and lower and right upper and lower extremities examined for tenderness, swelling, warmth of the appendicular  joints, range of motion, edema, rash.  Some of the important findings included: she does not have evidence of synovitis in the palpable joints of the upper extremities.  She has tenderness in the paracervical region in the lumbar area negative in the sacral thoracic region.  Her right knee is warmer than the left.  She does not have synovitis tenderness in her small joints of the hands or wrists, she has artificial nails.  She does not have enthesitis such as Bangor's tendon insertion.          Her asdas calculated 2.7: 6/3/6/7 CRP minimum 2 as her CRP was normal.  Patient Active Problem List    Diagnosis Date Noted    Dyspareunia in female 01/03/2025     Priority: Medium    Female stress incontinence 01/03/2025     Priority: Medium    Personal history of urinary tract infection 01/03/2025     Priority: Medium    Abdominal bloating 08/06/2024     Priority: Medium    Back pain 08/06/2024     Priority: Medium    Chest pain 08/06/2024     Priority: Medium    Chronic pain 08/06/2024     Priority: Medium    Iron deficiency 08/06/2024     Priority: Medium    Irregular bowel habits 08/06/2024     Priority: Medium    Panic attack 08/06/2024     Priority: Medium    Reduced mobility 08/06/2024     Priority: Medium    Abdominal distension, gaseous 10/28/2022     Priority: Medium    Digestive symptom 10/28/2022     Priority: Medium    Attention deficit hyperactivity disorder (ADHD), predominantly inattentive type 02/17/2022     Priority: Medium    COVID-19 08/05/2020     Priority: Medium     Past Surgical History:   Procedure Laterality Date    ENT SURGERY  11/2021    tonsillectomy    SHOULDER SURGERY Left 09/2023      Past Medical History:   Diagnosis Date    ADHD (attention deficit hyperactivity disorder)     Depressive disorder 2019    Uncomplicated asthma 2005     No Known Allergies  Current Outpatient Medications   Medication Sig Dispense Refill    amphetamine-dextroamphetamine (ADDERALL XR) 15 MG 24 hr capsule Take 15 mg  by mouth every morning.      amphetamine-dextroamphetamine (ADDERALL) 10 MG tablet Take 10 mg by mouth as needed.      celecoxib (CELEBREX) 200 MG capsule Take 1 capsule (200 mg) by mouth daily. 30 capsule 1    nitroFURantoin macrocrystal-monohydrate (MACROBID) 100 MG capsule Take 1 capsule (100 mg) by mouth daily as needed (as needed after intercourse). 30 capsule 3     family history includes Anxiety Disorder in her father; Depression in her father; Mental Illness in her father; Thyroid Disease in her mother.  Social Connections: Unknown (2/21/2024)    Received from [facility]    Social Connections     In the past 3 months, do you feel that you lack companionship or social support?: Not on file          WBC   Date Value Ref Range Status   04/19/2019 4.1 4.0 - 11.0 10e9/L Final     WBC Count   Date Value Ref Range Status   06/19/2025 5.2 4.0 - 11.0 10e3/uL Final     RBC Count   Date Value Ref Range Status   06/19/2025 4.74 3.80 - 5.20 10e6/uL Final   04/19/2019 5.28 3.7 - 5.3 10e12/L Final     Hemoglobin   Date Value Ref Range Status   06/19/2025 14.2 11.7 - 15.7 g/dL Final   04/19/2019 15.4 11.7 - 15.7 g/dL Final     Hematocrit   Date Value Ref Range Status   06/19/2025 41.8 35.0 - 47.0 % Final   04/19/2019 45.2 35.0 - 47.0 % Final     MCV   Date Value Ref Range Status   06/19/2025 88 78 - 100 fL Final   04/19/2019 86 77 - 100 fl Final     MCH   Date Value Ref Range Status   06/19/2025 30.0 26.5 - 33.0 pg Final   04/19/2019 29.2 26.5 - 33.0 pg Final     Platelet Count   Date Value Ref Range Status   06/19/2025 300 150 - 450 10e3/uL Final   04/19/2019 277 150 - 450 10e9/L Final     % Lymphocytes   Date Value Ref Range Status   12/04/2021 52 % Final   04/19/2019 39.6 % Final     AST   Date Value Ref Range Status   08/09/2023 28 0 - 45 U/L Final     Comment:     Reference intervals for this test were updated on 6/12/2023 to more accurately reflect our healthy population. There may be differences in the flagging  of prior results with similar values performed with this method. Interpretation of those prior results can be made in the context of the updated reference intervals.     ALT   Date Value Ref Range Status   06/19/2025 18 0 - 50 U/L Final     Albumin   Date Value Ref Range Status   06/19/2025 4.9 3.5 - 5.2 g/dL Final     Creatinine   Date Value Ref Range Status   06/19/2025 0.79 0.51 - 0.95 mg/dL Final     GFR Estimate   Date Value Ref Range Status   06/19/2025 >90 >60 mL/min/1.73m2 Final     Comment:     eGFR calculated using 2021 CKD-EPI equation.     Sed Rate   Date Value Ref Range Status   04/19/2019 4 0 - 15 mm/h Final     Erythrocyte Sedimentation Rate   Date Value Ref Range Status   10/04/2024 4 0 - 20 mm/hr Final     CRP Inflammation   Date Value Ref Range Status   04/19/2019 <2.9 0.0 - 8.0 mg/L Final

## 2025-07-05 LAB
GAMMA INTERFERON BACKGROUND BLD IA-ACNC: 0.07 IU/ML
M TB IFN-G BLD-IMP: NEGATIVE
M TB IFN-G CD4+ BCKGRND COR BLD-ACNC: 9.93 IU/ML
MITOGEN IGNF BCKGRD COR BLD-ACNC: 0 IU/ML
MITOGEN IGNF BCKGRD COR BLD-ACNC: 0 IU/ML
QUANTIFERON MITOGEN: 10 IU/ML
QUANTIFERON NIL TUBE: 0.07 IU/ML
QUANTIFERON TB1 TUBE: 0.07 IU/ML
QUANTIFERON TB2 TUBE: 0.07

## 2025-07-07 ENCOUNTER — TELEPHONE (OUTPATIENT)
Dept: RHEUMATOLOGY | Facility: CLINIC | Age: 20
End: 2025-07-07
Payer: COMMERCIAL

## 2025-07-07 NOTE — TELEPHONE ENCOUNTER
PA Initiation    Medication: HUMIRA (2 PEN) 40 MG/0.4ML SC AJKT  Insurance Company: [employer] - Phone [phone] Fax [phone]  Pharmacy Filling the Rx: [facility] - [place], [place] - [address]  Filling Pharmacy Phone:    Filling Pharmacy Fax:    Start Date: 7/7/2025    [id number]

## 2025-07-07 NOTE — TELEPHONE ENCOUNTER
Prior Authorization Approval    Medication: HUMIRA (2 PEN) 40 MG/0.4ML SC AJKT  Authorization Effective Date: 4/8/2025  Authorization Expiration Date: 7/7/2026  Approved Dose/Quantity: 2 per 28 days  Reference #: [id number]   Insurance Company: [employer] - Phone [phone] Fax [phone]  Expected CoPay: $ 0  CoPay Card Available: No    Financial Assistance Needed: No  Which Pharmacy is filling the prescription: [facility] - [place], [place] - [address]  Pharmacy Notified: Yes  Patient Notified: By pharmacy

## 2025-07-17 ENCOUNTER — PHARMACY VISIT (OUTPATIENT)
Dept: PHARMACY | Facility: CLINIC | Age: 20
End: 2025-07-17
Payer: COMMERCIAL

## 2025-07-17 NOTE — PROGRESS NOTES
Sacroiliitis Clinical Follow Up Assessment     Activity Medications    HUMIRA (2 PEN)     Summary Notes  Spoke with Kristen for assessment. Current Regimen: Humira 40mg every other week     Dosing Schedule: First dose: 7/10. She will add reminders for every other week. Discussed refill process and texts.     Tolerability: Denies SE/ISR     Sacroiliitis: Actually feeling pretty good. Maybe seeing some benefits already. Reviewed time to benefit.     Follow up: Pt declines active [facility] follow up. Will receive annual QoL text message.       Lashon Ferrer, PharmD  Therapy Management Pharmacist  [facility]

## 2025-08-13 ENCOUNTER — PATIENT OUTREACH (OUTPATIENT)
Dept: CARE COORDINATION | Facility: CLINIC | Age: 20
End: 2025-08-13
Payer: COMMERCIAL